# Patient Record
Sex: FEMALE | Race: BLACK OR AFRICAN AMERICAN | Employment: OTHER | URBAN - METROPOLITAN AREA
[De-identification: names, ages, dates, MRNs, and addresses within clinical notes are randomized per-mention and may not be internally consistent; named-entity substitution may affect disease eponyms.]

---

## 2017-02-27 ENCOUNTER — OFFICE VISIT (OUTPATIENT)
Dept: GYNECOLOGY | Age: 72
End: 2017-02-27

## 2017-02-27 ENCOUNTER — HOSPITAL ENCOUNTER (OUTPATIENT)
Dept: LAB | Age: 72
Discharge: HOME | End: 2017-02-27
Payer: MEDICARE

## 2017-02-27 VITALS
HEIGHT: 66 IN | BODY MASS INDEX: 47.09 KG/M2 | HEART RATE: 80 BPM | WEIGHT: 293 LBS | SYSTOLIC BLOOD PRESSURE: 163 MMHG | DIASTOLIC BLOOD PRESSURE: 85 MMHG

## 2017-02-27 DIAGNOSIS — C54.1 ENDOMETRIAL CANCER (HCC): Primary | ICD-10-CM

## 2017-02-27 DIAGNOSIS — E66.01 OBESITY, MORBID, BMI 50 OR HIGHER (HCC): ICD-10-CM

## 2017-02-27 PROCEDURE — 88142 CYTOPATH C/V THIN LAYER: CPT | Performed by: OBSTETRICS & GYNECOLOGY

## 2017-02-27 NOTE — PROGRESS NOTES
[address], [address], [address]  [id number] [phone]  Dr. Heber Garber, III    Tonya Gold, [facility]     Office Note  Patient ID:  Name: Magali Arguelles  MRM: [id number]  : 1945/71 y.o. Date: 2017    Diagnosis:     ICD-10-CM ICD-9-CM    1. Endometrial cancer (HCC) C54.1 182.0 PAP, LIQUID BASED, MANUAL SCREEN   2. Obesity, morbid, BMI 50 or higher (HCC) E66.01 278.01 PAP, LIQUID BASED, MANUAL SCREEN       Problem List:   Patient Active Problem List   Diagnosis Code    Atrial fibrillation ([facility] I48.91    Chronic anticoagulation Z79.01    Ob[facility], morbid, BMI 50 or higher [facility] E66.01       SUBJECTIVE:    Magali Arguelles is a 70 y.o. female who presents for followup postoperative care following staging resection, 10/19/2015,     The patient's pathology revealed   FINAL PATHOLOGIC DIAGNOSIS  1.  Uterus, bilateral fallopian tubes and ovaries, hysterectomy bilateral salpingo-oophorectomy:  Adenocarcinoma, endometrioid type of the uterine corpus, FIGO grade 1 of 3, invading 0.7cm of 1.5cm  multifocal (three foci)  Benign cervix, no tumor seen  Leiomyoma uteri  Endometriosis  Benign parametrial remnants, no tumor seen  Bilateral fallopian tubes, no tumor seen  Bilateral ovaries, no tumor seen  Synoptic Report:  Specimen: Uterus, bilateral fallopian tubes and ovaries  Procedure: Hysterectomy, bilateral salpingo-oophorectomy  Lymph node sampling: None  Specimen integrity: Intact hysterectomy specimen  Tumor size: 7.0 x 6.0 cm (left lateral); 2.0 (anterior fundus); 1.3cm (posterior)  Histologic type: Endometrioid adenocarcinoma  Histologic ndgndrndanddndend:nd nd2nd Myometrial invasion:  Depth of invasion: 0.7cm at the deepest location  Myometrial thickness: 1.5cm  Involvement of cervix: Not involved  Extent of involvement of other organs:  Right ovary: Not involved  Left ovary: Not involved  Right fallopian tube: Not involved  Left fallopian tube: Not involved  Lymphovascular invasion: Present  Additional pathologic findings: Leiomyoma, endometriosis  Pathologic staging (pTNM):  Primary tumor (pT): pT1a    Regional lymph nodes (pN): pNx  Pelvic lymph nodes:  Number examined: 0  Number involved: N/A  Para-aortic lymph nodes:  Number examined: 0  Number involved: N/A  Distant metastasis (M): Not applicable  2. Umbilicus, excision:  Benign skin and subcutaneous fibroadipose tissue, no histopathologic changes  3. Hernia, herniorrhaphy:  Benign mesothelial lined fibroadipose tissue with mild chronic inflammation consistent with hernia sac    Cytology: 8/25/2016, 2/2016   Negative    Mammogram to be scheduled. Currently she has no problems with eating, bowel movements, voiding. Occational stool softener  Appetite is good. Eating a regular diet without difficulty. Bowel movements are regular. Negative cardiopulmonary review. The patient is not having any pain. .    Medications:     Current Outpatient Prescriptions on File Prior to Visit   Medication Sig Dispense Refill    ferrous sulfate (IRON) 325 mg (65 mg iron) tablet Take 65 mg by mouth three (3) times daily (with meals).  MULTIVITAMIN (MULTIPLE VITAMINS PO) Take 1 Tab by mouth daily.  ELIQUIS 5 mg tablet       atorvastatin (LIPITOR) 20 mg tablet 20 mg nightly.  diltiazem CD (CARDIZEM CD) 240 mg ER capsule 240 mg daily.  levothyroxine (SYNTHROID) 75 mcg tablet       lisinopril (PRINIVIL, ZESTRIL) 20 mg tablet 20 mg daily.  torsemide (DEMADEX) 20 mg tablet daily.  oxyCODONE-acetaminophen (PERCOCET) 5-325 mg per tablet Take 1 Tab by mouth every four (4) hours as needed for Pain. Max Daily Amount: 6 Tabs. 25 Tab 0     No current facility-administered medications on file prior to visit.       Allergies:   No Known Allergies  Past Medical History:   Diagnosis Date    Arrhythmia     a fib    Arthritis     High cholesterol     History of GI diverticular bleed  Hypertension     Iron deficiency anemia     Morbid obesity ([facility]     Thyroid disease      Past Surgical History:   Procedure Laterality Date    HX COLONOSCOPY      HX ENDOSCOPY      HX GYN      2 vaginal births     Social History     Social History    Marital status:      Spouse name: N/A    Number of children: N/A    Years of education: N/A     Occupational History    Not on file. Social History Main Topics    Smoking status: Never Smoker    Smokeless tobacco: Never Used    Alcohol use No    Drug use: No    Sexual activity: Not Currently     Other Topics Concern    Not on file     Social History Narrative       OBJECTIVE:    Vitals:   Vitals:    02/27/17 1014   BP: 163/85   Pulse: 80   Weight: 338 lb 12.8 oz (153.7 kg)   Height: 5' 6\" (1.676 m)     Physical Examination:     General:  Nodes: HEENT:  no distress, oriented, alert  Negative throughout  Normocephalic, no JVD, no thyroid nodularity   Lungs: lungs clear to auscultationand percussion   Cardiac: Regular rate and rhythm   Abdomen: soft, bowel sounds active, non-tender  No CVA tenderness   Incision: healing well   Pelvic: Vulva: No gross lesion, BUS negative  Vagina: No suspicious lesion. No suspicious induration or nodularity. Cytology taken  Bimanual: No suspicious mass effect, no tenderness, Limited examination due to obesity   Rectal: not done   Extremity:   extremities normal, atraumatic, no cyanosis or edema     IMPRESSION:    Magali Arguelles is Doing well postoperatively. .  She has a working diagnosis of     ICD-10-CM ICD-9-CM    1. Endometrial cancer (HCC) C54.1 182.0 PAP, LIQUID BASED, MANUAL SCREEN   2. Obesity, morbid, BMI 50 or higher (HCC) E66.01 278.01 PAP, LIQUID BASED, MANUAL SCREEN    .     Medical problems:     Patient Active Problem List   Diagnosis Code    Atrial fibrillation (HCC) I48.91    Chronic anticoagulation Z79.01    Obesity, morbid, BMI 50 or higher ([facility] E66.01       PLAN:    The operative procedures and clinical results have been reviewed with the patient. Implications of diagnosis discussed at length. All questions answered. Observation only. Recurrence risks noted  Cytology taken  I will see the patient back in 6 month(s). The patient is advised to call our office with any problems or concerns. Goran Tran MD  2/27/2017/1:44 PM    Defined Sensitive Document    CC:   Bharath Tenorio MD   No ref.  provider found

## 2017-03-10 NOTE — PROGRESS NOTES
Patient:   Mitzi Moser  SSN: [id number]  : 1945    Date:    3/10/2017    Ms. Mitzi Moser cytology/Pap smear has been interpreted as within normal limts. I would ask that subsequent Pap smears be performed at the interval discussed at the last office visit.     If there are any questions please do not hesitate to contact our offices ([phone])    Terry Soni MD

## 2017-08-29 ENCOUNTER — OFFICE VISIT (OUTPATIENT)
Dept: GYNECOLOGY | Age: 72
End: 2017-08-29

## 2017-08-29 ENCOUNTER — HOSPITAL ENCOUNTER (OUTPATIENT)
Dept: LAB | Age: 72
Discharge: HOME | End: 2017-08-29
Payer: MEDICARE

## 2017-08-29 VITALS
BODY MASS INDEX: 47.09 KG/M2 | WEIGHT: 293 LBS | DIASTOLIC BLOOD PRESSURE: 74 MMHG | HEIGHT: 66 IN | HEART RATE: 75 BPM | SYSTOLIC BLOOD PRESSURE: 141 MMHG

## 2017-08-29 DIAGNOSIS — Z85.42 HISTORY OF ENDOMETRIAL CANCER: Primary | ICD-10-CM

## 2017-08-29 DIAGNOSIS — E66.01 OBESITY, MORBID, BMI 40.0-49.9 (HCC): ICD-10-CM

## 2017-08-29 PROCEDURE — 88142 CYTOPATH C/V THIN LAYER: CPT | Performed by: OBSTETRICS & GYNECOLOGY

## 2017-08-29 NOTE — PROGRESS NOTES
[address] [facility] [address], [address]  [phone]  Dr. Amado Nicole, [facility]     Office Note  Patient ID:  Name: Mario Leon  MRM: [id number]  : 1945/71 y.o. Date: 2017    Diagnosis:     ICD-10-CM ICD-9-CM    1. History of endometrial cancer Z85.42 V10.42    2. Obesity, morbid, BMI 40.0-49.9 ([facility]) E66.01 278.01        Problem List:   Patient Active Problem List   Diagnosis Code    Atrial fibrillation ([facility] I48.91    Chronic anticoagulation Z79.01    Obesity, morbid, BMI 50 or higher ([facility] E66.01       SUBJECTIVE:    Mario Leon is a 70 y.o. female who presents for followup postoperative care following staging resection, 10/19/2015,     The patient's pathology revealed   FINAL PATHOLOGIC DIAGNOSIS  1.  Uterus, bilateral fallopian tubes and ovaries, hysterectomy bilateral salpingo-oophorectomy:  Adenocarcinoma, endometrioid type of the uterine corpus, FIGO grade 1 of 3, invading 0.7cm of 1.5cm  multifocal (three foci)  Benign cervix, no tumor seen  Leiomyoma uteri  Endometriosis  Benign parametrial remnants, no tumor seen  Bilateral fallopian tubes, no tumor seen  Bilateral ovaries, no tumor seen  Synoptic Report:  Specimen: Uterus, bilateral fallopian tubes and ovaries  Procedure: Hysterectomy, bilateral salpingo-oophorectomy  Lymph node sampling: None  Specimen integrity: Intact hysterectomy specimen  Tumor size: 7.0 x 6.0 cm (left lateral); 2.0 (anterior fundus); 1.3cm (posterior)  Histologic type: Endometrioid adenocarcinoma  Histologic ndgndrndanddndend:nd nd2nd Myometrial invasion:  Depth of invasion: 0.7cm at the deepest location  Myometrial thickness: 1.5cm  Involvement of cervix: Not involved  Extent of involvement of other organs:  Right ovary: Not involved  Left ovary: Not involved  Right fallopian tube: Not involved  Left fallopian tube: Not involved  Lymphovascular invasion: Present  Additional pathologic findings: Leiomyoma, endometriosis  Pathologic staging (pTNM):  Primary tumor (pT): pT1a    Regional lymph nodes (pN): pNx  Pelvic lymph nodes:  Number examined: 0  Number involved: N/A  Para-aortic lymph nodes:  Number examined: 0  Number involved: N/A  Distant metastasis (M): Not applicable  2. Umbilicus, excision:  Benign skin and subcutaneous fibroadipose tissue, no histopathologic changes  3. Hernia, herniorrhaphy:  Benign mesothelial lined fibroadipose tissue with mild chronic inflammation consistent with hernia sac    Cytology: 2/27/2017, 8/25/2016, 2/2016   Negative    Mammogram to be scheduled. Currently she has no problems with eating, bowel movements, voiding. Occational stool softener  Appetite is good. Eating a regular diet without difficulty. Bowel movements are regular. Negative cardiopulmonary review. The patient is not having any pain. .    Medications:     Current Outpatient Prescriptions on File Prior to Visit   Medication Sig Dispense Refill    GLUCOSAMINE HCL/CHONDR VALERIO A NA (OSTEO BI-FLEX PO) Take  by mouth.  ferrous sulfate (IRON) 325 mg (65 mg iron) tablet Take 65 mg by mouth three (3) times daily (with meals).  MULTIVITAMIN (MULTIPLE VITAMINS PO) Take 1 Tab by mouth daily.  ELIQUIS 5 mg tablet       atorvastatin (LIPITOR) 20 mg tablet 20 mg nightly.  diltiazem CD (CARDIZEM CD) 240 mg ER capsule 240 mg daily.  levothyroxine (SYNTHROID) 75 mcg tablet       lisinopril (PRINIVIL, ZESTRIL) 20 mg tablet 20 mg daily.  torsemide (DEMADEX) 20 mg tablet daily.  oxyCODONE-acetaminophen (PERCOCET) 5-325 mg per tablet Take 1 Tab by mouth every four (4) hours as needed for Pain. Max Daily Amount: 6 Tabs. 25 Tab 0     No current facility-administered medications on file prior to visit.       Allergies:   No Known Allergies  Past Medical History:   Diagnosis Date    Arrhythmia     a fib    Arthritis     High cholesterol     History of GI diverticular bleed     Hypertension     Iron deficiency anemia     Morbid obesity ([facility]     Thyroid disease      Past Surgical History:   Procedure Laterality Date    HX COLONOSCOPY      HX ENDOSCOPY      HX GYN      2 vaginal births     Social History     Social History    Marital status:      Spouse name: N/A    Number of children: N/A    Years of education: N/A     Occupational History    Not on file. Social History Main Topics    Smoking status: Never Smoker    Smokeless tobacco: Never Used    Alcohol use No    Drug use: No    Sexual activity: Not Currently     Other Topics Concern    Not on file     Social History Narrative       OBJECTIVE:    Vitals:   Vitals:    08/29/17 1012   BP: 141/74   Pulse: 75   Weight: 153 kg (337 lb 6.4 oz)   Height: 5' 6\" (1.676 m)     Physical Examination:     General:  Nodes: HEENT:  no distress, oriented, alert  Negative throughout  Normocephalic, no JVD, no thyroid nodularity   Lungs: lungs clear to auscultationand percussion   Cardiac: Regular rate and rhythm   Abdomen: soft, bowel sounds active, non-tender  No CVA tenderness   Incision: healing well   Pelvic: Vulva: No gross lesion, BUS negative  Vagina: No suspicious lesion. No suspicious induration or nodularity. Cytology taken  Bimanual: No suspicious mass effect, no tenderness, Limited examination due to obesity   Rectal: not done   Extremity:   extremities normal, atraumatic, no cyanosis or edema     IMPRESSION:    [place] is Doing well postoperatively. .  She has a working diagnosis of     ICD-10-CM ICD-9-CM    1. History of endometrial cancer Z85.42 V10.42    2. Obesity, morbid, BMI 40.0-49.9 ([facility]) E66.01 278.01     .     Medical problems:     Patient Active Problem List   Diagnosis Code    Atrial fibrillation ([facility]) I48.91    Chronic anticoagulation Z79.01    Obesity, morbid, BMI 50 or higher ([facility] E66.01       PLAN:    The operative procedures and clinical results have been reviewed with the patient. Implications of diagnosis discussed at length. All questions answered. Observation only. Recurrence risks noted  Cytology taken  I will see the patient back in 6 month(s). The patient is advised to call our office with any problems or concerns. Liban Lobo MD  8/29/2017/1:44 PM    Defined Sensitive Document    CC:   Leonidas Badillo MD   No ref.  provider found

## 2017-08-29 NOTE — PROGRESS NOTES
6 month check up, pt complains of left sided soreness in there pelvic region that is off and on and she has noticed it for the past few months, Patient states she is no longer taking the following medications: Percocet

## 2017-08-29 NOTE — PATIENT INSTRUCTIONS
[employer] Activation    Thank you for requesting access to [employer]. Please follow the instructions below to securely access and download your online medical record. [employer] allows you to send messages to your doctor, view your test results, renew your prescriptions, schedule appointments, and more. How Do I Sign Up? 1. In your internet browser, go to [email or web address]. 2. Click on the First Time User? Click Here link in the Sign In box. You will see the New Member Sign Up page. 3. Enter your [employer] Access Code exactly as it appears below. You will not need to use this code after youve completed the sign-up process. If you do not sign up before the expiration date, you must request a new code. [employer] Access Code: [email or web address]  Expires: 2017 10:08 AM (This is the date your [employer] access code will )    4. Enter the last four digits of your Social Security Number (xxxx) and Date of Birth (mm/dd/yyyy) as indicated and click Submit. You will be taken to the next sign-up page. 5. Create a [employer] ID. This will be your [employer] login ID and cannot be changed, so think of one that is secure and easy to remember. 6. Create a [employer] password. You can change your password at any time. 7. Enter your Password Reset Question and Answer. This can be used at a later time if you forget your password. 8. Enter your e-mail address. You will receive e-mail notification when new information is available in [address]. 9. Click Sign Up. You can now view and download portions of your medical record. 10. Click the Download Summary menu link to download a portable copy of your medical information. Additional Information    If you have questions, please visit the Frequently Asked Questions section of the [employer] website at [email or web address] Remember, [employer] is NOT to be used for urgent needs. For medical emergencies, dial 911.

## 2017-09-19 NOTE — PROGRESS NOTES
Patient:   Zena Grewal  SSN: [id number]  : 1945    Date:    2017    Ms. Zena Grewal cytology/Pap smear has been interpreted as within normal limts. I would ask that subsequent Pap smears be performed at the interval discussed at the last office visit.     If there are any questions please do not hesitate to contact our offices ([phone])    Terry Soni MD

## 2017-09-19 NOTE — PROGRESS NOTES
Patient:   Lew Huff  SSN: [id number]  : 1945    Date:    2017    Ms. Lew Huff cytology/Pap smear has been interpreted as within normal limts. I would ask that subsequent Pap smears be performed at the interval discussed at the last office visit.     If there are any questions please do not hesitate to contact our offices ([phone])    Terry Soni MD

## 2018-02-20 ENCOUNTER — HOSPITAL ENCOUNTER (OUTPATIENT)
Dept: LAB | Age: 73
Discharge: HOME | End: 2018-02-20
Payer: MEDICARE

## 2018-02-20 ENCOUNTER — OFFICE VISIT (OUTPATIENT)
Dept: GYNECOLOGY | Age: 73
End: 2018-02-20

## 2018-02-20 VITALS
DIASTOLIC BLOOD PRESSURE: 79 MMHG | BODY MASS INDEX: 47.09 KG/M2 | SYSTOLIC BLOOD PRESSURE: 122 MMHG | WEIGHT: 293 LBS | HEART RATE: 71 BPM | HEIGHT: 66 IN

## 2018-02-20 DIAGNOSIS — Z85.42 HISTORY OF ENDOMETRIAL CANCER: Primary | ICD-10-CM

## 2018-02-20 DIAGNOSIS — E66.01 OBESITY, MORBID, BMI 40.0-49.9 (HCC): ICD-10-CM

## 2018-02-20 PROCEDURE — 88142 CYTOPATH C/V THIN LAYER: CPT | Performed by: OBSTETRICS & GYNECOLOGY

## 2018-02-20 RX ORDER — ALLOPURINOL 100 MG/1
TABLET ORAL
COMMUNITY
Start: 2017-12-18

## 2018-02-20 NOTE — PROGRESS NOTES
6 month check up, pt reports no abnormal spotting or bleeding, pt states she has no questions or concerns for today's visit, Patient states she is no longer taking the following medications: percocet, Initial blood pressure reading 144/98, repeat blood pressure reading 122/79, the note under my ACP was placed in error, it was deleted but still shows

## 2018-02-20 NOTE — PROGRESS NOTES
[address], [address]  [phone]  Dr. Mariam Love, III    Dr. Adrianne Garza, Raine Gamaliel, [facility]     Office Note  Patient ID:  Name: Ankita Garcia  MRM: [id number]  : 1945/72 y.o. Date: 2018    Diagnosis:     ICD-10-CM ICD-9-CM    1. History of endometrial cancer Z85.42 V10.42 PAP, LIQUID BASED, MANUAL SCREEN   2. Obesity, morbid, BMI 40.0-49.9 ([facility]) E66.01 278.01        Problem List:   Patient Active Problem List   Diagnosis Code    Atrial fibrillation ([facility] I48.91    Chronic anticoagulation Z79.01    Ob[facility], morbid, BMI 50 or higher [facility] E66.01       SUBJECTIVE:    Ankita Garcia is a 67 y.o. female who presents for followup care following staging resection, 10/19/2015,     The patient's pathology revealed   FINAL PATHOLOGIC DIAGNOSIS  1.  Uterus, bilateral fallopian tubes and ovaries, hysterectomy bilateral salpingo-oophorectomy:  Adenocarcinoma, endometrioid type of the uterine corpus, FIGO grade 1 of 3, invading 0.7cm of 1.5cm  multifocal (three foci)  Benign cervix, no tumor seen  Leiomyoma uteri  Endometriosis  Benign parametrial remnants, no tumor seen  Bilateral fallopian tubes, no tumor seen  Bilateral ovaries, no tumor seen  Synoptic Report:  Specimen: Uterus, bilateral fallopian tubes and ovaries  Procedure: Hysterectomy, bilateral salpingo-oophorectomy  Lymph node sampling: None  Specimen integrity: Intact hysterectomy specimen  Tumor size: 7.0 x 6.0 cm (left lateral); 2.0 (anterior fundus); 1.3cm (posterior)  Histologic type: Endometrioid adenocarcinoma  Histologic ndgndrndanddndend:nd nd2nd Myometrial invasion:  Depth of invasion: 0.7cm at the deepest location  Myometrial thickness: 1.5cm  Involvement of cervix: Not involved  Extent of involvement of other organs:  Right ovary: Not involved  Left ovary: Not involved  Right fallopian tube: Not involved  Left fallopian tube: Not involved  Lymphovascular invasion: Present  Additional pathologic findings: Leiomyoma, endometriosis  Pathologic staging (pTNM):  Primary tumor (pT): pT1a    Regional lymph nodes (pN): pNx  Pelvic lymph nodes:  Number examined: 0  Number involved: N/A  Para-aortic lymph nodes:  Number examined: 0  Number involved: N/A  Distant metastasis (M): Not applicable  2. Umbilicus, excision:  Benign skin and subcutaneous fibroadipose tissue, no histopathologic changes  3. Hernia, herniorrhaphy:  Benign mesothelial lined fibroadipose tissue with mild chronic inflammation consistent with hernia sac    Cytology: 8/2017, 2/27/2017, 8/25/2016, 2/2016   Negative    Mammogram to be scheduled. 2/20/2018: The patient presents at a six month interval.  Active, no restrictions. Ongoing treatment for hyperuricacidemia (Allopurinal)  Currently she has no problems with eating, bowel movements, voiding. Occational stool softener  Appetite is good. Eating a regular diet without difficulty. Bowel movements are regular. Negative cardiopulmonary review. The patient is not having any pain. .    Medications:     Current Outpatient Prescriptions on File Prior to Visit   Medication Sig Dispense Refill    GLUCOSAMINE HCL/CHONDR VALERIO A NA (OSTEO BI-FLEX PO) Take  by mouth.  ferrous sulfate (IRON) 325 mg (65 mg iron) tablet Take 65 mg by mouth three (3) times daily (with meals).  MULTIVITAMIN (MULTIPLE VITAMINS PO) Take 1 Tab by mouth daily.  ELIQUIS 5 mg tablet       atorvastatin (LIPITOR) 20 mg tablet 20 mg nightly.  diltiazem CD (CARDIZEM CD) 240 mg ER capsule 240 mg daily.  levothyroxine (SYNTHROID) 75 mcg tablet       lisinopril (PRINIVIL, ZESTRIL) 20 mg tablet 20 mg daily.  torsemide (DEMADEX) 20 mg tablet daily.  oxyCODONE-acetaminophen (PERCOCET) 5-325 mg per tablet Take 1 Tab by mouth every four (4) hours as needed for Pain. Max Daily Amount: 6 Tabs.  25 Tab 0     No current facility-administered medications on file prior to visit. Allergies:   No Known Allergies  Past Medical History:   Diagnosis Date    Arrhythmia     a fib    Arthritis     Gout 06/2017    High cholesterol     History of GI diverticular bleed     Hypertension     Iron deficiency anemia     Morbid obesity ([address]     Thyroid disease      Past Surgical History:   Procedure Laterality Date    HX COLONOSCOPY      HX ENDOSCOPY      HX GYN      2 vaginal births     Social History     Social History    Marital status:      Spouse name: N/A    Number of children: N/A    Years of education: N/A     Occupational History    Not on file. Social History Main Topics    Smoking status: Never Smoker    Smokeless tobacco: Never Used    Alcohol use No    Drug use: No    Sexual activity: Not Currently     Other Topics Concern    Not on file     Social History Narrative       OBJECTIVE:    Vitals:   Vitals:    02/20/18 1041 02/20/18 1047   BP: (!) 144/98 122/79   Pulse: 68 71   Weight: 335 lb (152 kg)    Height: 5' 6\" (1.676 m)      Physical Examination:     General:  Nodes: HEENT:  no distress, oriented, alert  Negative throughout  Normocephalic, no JVD, no thyroid nodularity   Lungs: lungs clear to auscultationand percussion   Cardiac: Regular rate and rhythm   Abdomen: soft, bowel sounds active, non-tender  No CVA tenderness   Incision: healing well   Pelvic: Vulva: No gross lesion, BUS negative  Vagina: No suspicious lesion. No suspicious induration or nodularity. Cytology taken  Bimanual: No suspicious mass effect, no tenderness, Limited examination due to obesity   Rectal: not done   Extremity:   extremities normal, atraumatic, no cyanosis or edema     IMPRESSION:    Joe Zapata  has a working diagnosis of     ICD-10-CM ICD-9-CM    1. History of endometrial cancer Z85.42 V10.42 PAP, LIQUID BASED, MANUAL SCREEN   2. Obesity, morbid, BMI 40.0-49.9 ([facility]) E66.01 278.01     .     Medical problems:     Patient Active Problem List Diagnosis Code    Atrial fibrillation (HCC) I48.91    Chronic anticoagulation Z79.01    Obesity, morbid, BMI 50 or higher (HCC) E66.01       PLAN:    The operative procedures and clinical results have been reviewed with the patient. Implications of diagnosis discussed at length. All questions answered. Observation only. Recurrence risks noted  Cytology taken  I will see the patient back in 6 month(s). The patient is advised to call our office with any problems or concerns. Maria Del Rosario Noguera MD  2/20/2018/1:44 PM    Defined Sensitive Document    CC:   Lala Muniz MD   No ref.  provider found

## 2018-03-19 NOTE — PROGRESS NOTES
Patient:   Jourdan Maya  SSN: [id number]  : 1945    Date:    3/19/2018    Ms. Jourdan Maya cytology/Pap smear has been interpreted as within normal limts. I would ask that subsequent Pap smears be performed at the interval discussed at the last office visit.     If there are any questions please do not hesitate to contact our offices ([phone])    Steven Epps MD

## 2018-08-21 ENCOUNTER — OFFICE VISIT (OUTPATIENT)
Dept: GYNECOLOGY | Age: 73
End: 2018-08-21

## 2018-08-21 ENCOUNTER — HOSPITAL ENCOUNTER (OUTPATIENT)
Dept: LAB | Age: 73
Discharge: HOME | End: 2018-08-21
Payer: MEDICARE

## 2018-08-21 VITALS
SYSTOLIC BLOOD PRESSURE: 148 MMHG | DIASTOLIC BLOOD PRESSURE: 98 MMHG | HEART RATE: 85 BPM | HEIGHT: 66 IN | WEIGHT: 293 LBS | BODY MASS INDEX: 47.09 KG/M2

## 2018-08-21 DIAGNOSIS — Z85.42 HISTORY OF ENDOMETRIAL CANCER: Primary | ICD-10-CM

## 2018-08-21 DIAGNOSIS — E66.01 OBESITY, MORBID, BMI 50 OR HIGHER (HCC): ICD-10-CM

## 2018-08-21 PROCEDURE — 88142 CYTOPATH C/V THIN LAYER: CPT | Performed by: OBSTETRICS & GYNECOLOGY

## 2018-08-21 NOTE — PATIENT INSTRUCTIONS
Ynsect Activation    Thank you for requesting access to Ynsect. Please follow the instructions below to securely access and download your online medical record. Ynsect allows you to send messages to your doctor, view your test results, renew your prescriptions, schedule appointments, and more. How Do I Sign Up? 1. In your internet browser, go to [email or web address]. 2. Click on the First Time User? Click Here link in the Sign In box. You will see the New Member Sign Up page. 3. Enter your Ynsect Access Code exactly as it appears below. You will not need to use this code after youve completed the sign-up process. If you do not sign up before the expiration date, you must request a new code. Ynsect Access Code: [email or web address]  Expires: 2018  8:18 AM (This is the date your Ynsect access code will )    4. Enter the last four digits of your Social Security Number (xxxx) and Date of Birth (mm/dd/yyyy) as indicated and click Submit. You will be taken to the next sign-up page. 5. Create a Ynsect ID. This will be your Ynsect login ID and cannot be changed, so think of one that is secure and easy to remember. 6. Create a Ynsect password. You can change your password at any time. 7. Enter your Password Reset Question and Answer. This can be used at a later time if you forget your password. 8. Enter your e-mail address. You will receive e-mail notification when new information is available in [address] 9. Click Sign Up. You can now view and download portions of your medical record. 10. Click the Download Summary menu link to download a portable copy of your medical information. Additional Information    If you have questions, please visit the Frequently Asked Questions section of the Ynsect website at [email or web address] Remember, Ynsect is NOT to be used for urgent needs. For medical emergencies, dial 911.

## 2018-08-21 NOTE — PROGRESS NOTES
[address] [facility] [address], [address]  [phone]  Dr. Riley Ramirez, III    Facundo Jay, [facility]     Office Note  Patient ID:  Name: Adiel Young  MRM: [id number]  : 1945/72 y.o. Date: 2018    Diagnosis:     ICD-10-CM ICD-9-CM    1. History of endometrial cancer Z85.42 V10.42 PAP, LIQUID BASED, MANUAL SCREEN   2. Obesity, morbid, BMI 50 or higher ([facility]) E66.01 278.01        Problem List:   Patient Active Problem List   Diagnosis Code    Atrial fibrillation ([facility] I48.91    Chronic anticoagulation Z79.01    Obesity, morbid, BMI 50 or higher ([facility] E66.01       SUBJECTIVE:    Adiel Young is a 67 y.o. female who presents for followup care following staging resection, 10/19/2015,     The patient's pathology revealed   FINAL PATHOLOGIC DIAGNOSIS  1.  Uterus, bilateral fallopian tubes and ovaries, hysterectomy bilateral salpingo-oophorectomy:  Adenocarcinoma, endometrioid type of the uterine corpus, FIGO grade 1 of 3, invading 0.7cm of 1.5cm  multifocal (three foci)  Benign cervix, no tumor seen  Leiomyoma uteri  Endometriosis  Benign parametrial remnants, no tumor seen  Bilateral fallopian tubes, no tumor seen  Bilateral ovaries, no tumor seen  Synoptic Report:  Specimen: Uterus, bilateral fallopian tubes and ovaries  Procedure: Hysterectomy, bilateral salpingo-oophorectomy  Lymph node sampling: None  Specimen integrity: Intact hysterectomy specimen  Tumor size: 7.0 x 6.0 cm (left lateral); 2.0 (anterior fundus); 1.3cm (posterior)  Histologic type: Endometrioid adenocarcinoma  Histologic ndgndrndanddndend:nd nd2nd Myometrial invasion:  Depth of invasion: 0.7cm at the deepest location  Myometrial thickness: 1.5cm  Involvement of cervix: Not involved  Extent of involvement of other organs:  Right ovary: Not involved  Left ovary: Not involved  Right fallopian tube: Not involved  Left fallopian tube: Not involved  Lymphovascular invasion: Present  Additional pathologic findings: Leiomyoma, endometriosis  Pathologic staging (pTNM):  Primary tumor (pT): pT1a    Regional lymph nodes (pN): pNx  Pelvic lymph nodes:  Number examined: 0  Number involved: N/A  Para-aortic lymph nodes:  Number examined: 0  Number involved: N/A  Distant metastasis (M): Not applicable  2. Umbilicus, excision:  Benign skin and subcutaneous fibroadipose tissue, no histopathologic changes  3. Hernia, herniorrhaphy:  Benign mesothelial lined fibroadipose tissue with mild chronic inflammation consistent with hernia sac    Cytology: 2/2018, 8/2017, 2/27/2017, 8/25/2016, 2/2016   Negative    Mammogram to be scheduled. 8/21/2018: The patient presents at a six month interval.  Active, no restrictions. Ongoing treatment for hyperuricacidemia (Allopurinal)  Currently she has no problems with eating, bowel movements, voiding. Occational stool softener  Appetite is good. Eating a regular diet without difficulty. Bowel movements are regular. Negative cardiopulmonary review. The patient is not having any pain. .    Medications:     Current Outpatient Prescriptions on File Prior to Visit   Medication Sig Dispense Refill    allopurinol (ZYLOPRIM) 100 mg tablet       GLUCOSAMINE HCL/CHONDR VALERIO A NA (OSTEO BI-FLEX PO) Take  by mouth.  ferrous sulfate (IRON) 325 mg (65 mg iron) tablet Take 65 mg by mouth three (3) times daily (with meals).  MULTIVITAMIN (MULTIPLE VITAMINS PO) Take 1 Tab by mouth daily.  ELIQUIS 5 mg tablet       atorvastatin (LIPITOR) 20 mg tablet 20 mg nightly.  diltiazem CD (CARDIZEM CD) 240 mg ER capsule 240 mg daily.  levothyroxine (SYNTHROID) 75 mcg tablet       lisinopril (PRINIVIL, ZESTRIL) 20 mg tablet 20 mg daily.  torsemide (DEMADEX) 20 mg tablet daily.  oxyCODONE-acetaminophen (PERCOCET) 5-325 mg per tablet Take 1 Tab by mouth every four (4) hours as needed for Pain. Max Daily Amount: 6 Tabs.  25 Tab 0     No current facility-administered medications on file prior to visit. Allergies:   No Known Allergies  Past Medical History:   Diagnosis Date    Arrhythmia     a fib    Arthritis     Gout 06/2017    High cholesterol     History of GI diverticular bleed     Hypertension     Iron deficiency anemia     Morbid obesity ([address]     Thyroid disease      Past Surgical History:   Procedure Laterality Date    HX COLONOSCOPY      HX ENDOSCOPY      HX GYN      2 vaginal births     Social History     Social History    Marital status:      Spouse name: N/A    Number of children: N/A    Years of education: N/A     Occupational History    Not on file. Social History Main Topics    Smoking status: Never Smoker    Smokeless tobacco: Never Used    Alcohol use No    Drug use: No    Sexual activity: Not Currently     Other Topics Concern    Not on file     Social History Narrative       OBJECTIVE:    Vitals:   Vitals:    08/21/18 0835   BP: (!) 148/98   Pulse: 85   Weight: 334 lb (151.5 kg)   Height: 5' 6\" (1.676 m)     Physical Examination:     General:  Nodes: HEENT:  no distress, oriented, alert  Negative throughout  Normocephalic, no JVD, no thyroid nodularity   Lungs: lungs clear to auscultationand percussion   Cardiac: Irregular rate consistent with AF. Peripheral pulse rate 80's   Abdomen: Rotund, soft, bowel sounds active, non-tender, no gross organomegaly or fluid wave, limited examination due to habitus. No CVA tenderness   Incision: healing well   Pelvic: Vulva: No gross lesion, BUS negative  Vagina: No suspicious lesion. No suspicious induration or nodularity. Cytology taken  Bimanual: No suspicious mass effect, no tenderness, Limited examination due to obesity   Rectal: not done   Extremity:   extremities normal, atraumatic, no cyanosis or edema     IMPRESSION:    Yenni Carrion  has a working diagnosis of     ICD-10-CM ICD-9-CM    1.  History of endometrial cancer Z85.42 V10.42 PAP, LIQUID BASED, MANUAL SCREEN   2. Obesity, morbid, BMI 50 or higher (HCC) E66.01 278.01     . Medical problems:     Patient Active Problem List   Diagnosis Code    Atrial fibrillation (HCC) I48.91    Chronic anticoagulation Z79.01    Obesity, morbid, BMI 50 or higher ([facility] [address] E66.01       PLAN:    Recurrence risks noted  Cytology taken  I will see the patient back in 6 month(s). The patient is advised to call our office with any problems or concerns. Continue Cardiology and IM consultation. Weight loss encouraged. Landon Ortega MD  8/21/2018/1:44 PM    Defined Sensitive Document    CC:   Breanna Patel MD   No ref.  provider found

## 2018-08-26 NOTE — PROGRESS NOTES
Patient:   Sindy Golden  SSN: [id number]  : 1945    Date:    2018    Ms. Sindy Golden cytology/Pap smear has been interpreted as within normal limts. I would ask that subsequent Pap smears be performed at the interval discussed at the last office visit.     If there are any questions please do not hesitate to contact our offices [phone] Sam Epps MD

## 2019-02-11 NOTE — PROGRESS NOTES
[address] [facility], [address] [place] [address] 
26 [id number] [phone] Office Note Patient ID: 
Name: Angel Merida MRM: [id number] : 73 y.o. Date: 2019 Problem List:  
Patient Active Problem List  
Diagnosis Code  Atrial fibrillation (HCC) I48.91  
 Chronic anticoagulation Z79.01  
 Obesity, morbid, BMI 50 or higher (HCC) E66.01  
 Endometrial cancer (HCC) C54.1 SUBJECTIVE: 
Ms. Angel Merida is a 68 y.o. female s/p TLH/BSO/staging on 10/19/2015 for Stage Ia, FIGO Grade 1 endometrial cancer. Presents back today for continued surveillance. Reports an episode of spotting in December, but denies any bleeding since that time. Denies pelvic/abdominal pain, nausea, vomiting, change in appetite or bowel habits, bloating, hematuria, or hematochezia. Reports some constipation, but this is normal for her. Pathology Review 10/19/2015: 
FINAL PATHOLOGIC DIAGNOSIS 1. Uterus, bilateral fallopian tubes and ovaries, hysterectomy bilateral salpingo-oophorectomy: 
Adenocarcinoma, endometrioid type of the uterine corpus, FIGO grade 1 of 3, invading 0.7cm of 1.5cm 
multifocal (three foci) Benign cervix, no tumor seen Leiomyoma uteri Endometriosis Benign parametrial remnants, no tumor seen Bilateral fallopian tubes, no tumor seen Bilateral ovaries, no tumor seen Synoptic Report: 
Specimen: Uterus, bilateral fallopian tubes and ovaries Procedure: Hysterectomy, bilateral salpingo-oophorectomy Lymph node sampling: None Specimen integrity: Intact hysterectomy specimen Tumor size: 7.0 x 6.0 cm (left lateral); 2.0 (anterior fundus); 1.3cm (posterior) Histologic type: Endometrioid adenocarcinoma Histologic ndgndrndanddndend:nd nd2nd Myometrial invasion: 
Depth of invasion: 0.7cm at the deepest location Myometrial thickness: 1.5cm Involvement of cervix: Not involved Extent of involvement of other organs: 
Right ovary: Not involved Left ovary: Not involved Right fallopian tube: Not involved Left fallopian tube: Not involved Lymphovascular invasion: Present Additional pathologic findings: Leiomyoma, endometriosis Pathologic staging (pTNM): 
Primary tumor (pT): pT1a Regional lymph nodes (pN): pNx Pelvic lymph nodes: 
Number examined: 0 Number involved: N/A Para-aortic lymph nodes: 
Number examined: 0 Number involved: N/A Distant metastasis (M): Not applicable 2. Umbilicus, excision: 
Benign skin and subcutaneous fibroadipose tissue, no histopathologic changes 3. Hernia, herniorrhaphy: 
Benign mesothelial lined fibroadipose tissue with mild chronic inflammation consistent with hernia sac Cytology Review: 
Negative: 2/2018, 8/2017, 2/27/2017, 8/25/2016, 2/2016 Medications: 
  
Current Outpatient Medications on File Prior to Visit Medication Sig Dispense Refill  allopurinol (ZYLOPRIM) 100 mg tablet  ferrous sulfate (IRON) 325 mg (65 mg iron) tablet Take 65 mg by mouth three (3) times daily (with meals).  MULTIVITAMIN (MULTIPLE VITAMINS PO) Take 1 Tab by mouth daily.  ELIQUIS 5 mg tablet  atorvastatin (LIPITOR) 20 mg tablet 20 mg nightly.  diltiazem CD (CARDIZEM CD) 240 mg ER capsule 240 mg daily.  levothyroxine (SYNTHROID) 75 mcg tablet  lisinopril (PRINIVIL, ZESTRIL) 20 mg tablet 20 mg daily.  torsemide (DEMADEX) 20 mg tablet daily.  oxyCODONE-acetaminophen (PERCOCET) 5-325 mg per tablet Take 1 Tab by mouth every four (4) hours as needed for Pain. Max Daily Amount: 6 Tabs. 25 Tab 0 No current facility-administered medications on file prior to visit. Allergies: 
 No Known Allergies Past Medical History:  
Diagnosis Date  Arrhythmia   
 a fib  Arthritis  Gout 06/2017  High cholesterol  History of GI diverticular bleed  Hypertension  Iron deficiency anemia  Morbid obesity ([address]  Thyroid disease Past Surgical History: Procedure Laterality Date  HX COLONOSCOPY    
 HX ENDOSCOPY    
 HX GYN    
 2 vaginal births Social History Socioeconomic History  Marital status:  Spouse name: Not on file  Number of children: Not on file  Years of education: Not on file  Highest education level: Not on file Social Needs  Financial resource strain: Not on file  Food insecurity - worry: Not on file  Food insecurity - inability: Not on file  Transportation needs - medical: Not on file  Transportation needs - non-medical: Not on file Occupational History  Not on file Tobacco Use  Smoking status: Never Smoker  Smokeless tobacco: Never Used Substance and Sexual Activity  Alcohol use: No  
  Alcohol/week: 0.0 oz  Drug use: No  
 Sexual activity: Not Currently Other Topics Concern  Not on file Social History Narrative  Not on file OBJECTIVE: 
Physical Exam: 
Visit Vitals /87 (BP 1 Location: Left arm, BP Patient Position: Sitting) Pulse 76 Ht 5' 6\" (1.676 m) Wt 337 lb (152.9 kg) BMI 54.39 kg/m² General: Alert and oriented. No acute distress. Well-nourished HEENT: No thyroid enlargment. Neck supple without restrictions. Sclera normal. Normal occular motion. Moist mucous membranes. Lymphatics: No evidence of axillary, cervical, or subclavicular adenopathy. Respiratory: clear to auscultation and percussion to the bases. No CVAT. Cardiovascular: regular rate and rhythm. No murmurs, rubs, or gallops. Gastrointestinal: obese, soft, non-tender, non-distended, no masses or organomegaly. Well-healed incisions. Musculoskeletal: normal gait. No joint tenderness, deformity or swelling. No muscular tenderness. Extremities: extremities normal, atraumatic, no cyanosis or edema. Pelvic: exam chaperoned by nurse. Normal appearing external genitalia. On speculum exam, the vagina is atrophic.  The uterus and cervix are surgically absent. No evidence of masses or nodularity on bimanual exam. Deferred rectovaginal exam.  
Neuro: Grossly intact. Normal gait and movement. No acute deficit Skin: No evidence of rashes or skin changes. IMPRESSION/PLAN: 
 
Ms. Anshul Stout is a 68 y.o. female s/p TLH/BSO/staging on 10/19/2015 for Stage Ia, FIGO Grade 1 endometrial cancer. Problem List Items Addressed This Visit Circulatory Atrial fibrillation ([facility] [address] Reproductive Endometrial cancer ([facility] [address] - Primary Other Obesity, morbid, BMI 50 or higher ([facility] [address] Reviewed patient's course to date. SHAKEEL on exam. Reassured patient. RTC in 6 months for continued surveillance. Reviewed precautionary symptoms to return sooner. All questions and concerns were addressed with the patient and she is comfortable with the plan. Basilio Gloria MD 
 
 
CC: 
 Esthela Santa MD 
 No ref. provider found

## 2019-02-12 ENCOUNTER — OFFICE VISIT (OUTPATIENT)
Dept: GYNECOLOGY | Age: 74
End: 2019-02-12

## 2019-02-12 VITALS
BODY MASS INDEX: 47.09 KG/M2 | WEIGHT: 293 LBS | HEIGHT: 66 IN | SYSTOLIC BLOOD PRESSURE: 149 MMHG | DIASTOLIC BLOOD PRESSURE: 87 MMHG | HEART RATE: 76 BPM

## 2019-02-12 DIAGNOSIS — C54.1 ENDOMETRIAL CANCER (HCC): Primary | ICD-10-CM

## 2019-02-12 DIAGNOSIS — I48.91 ATRIAL FIBRILLATION, UNSPECIFIED TYPE (HCC): ICD-10-CM

## 2019-02-12 DIAGNOSIS — E66.01 OBESITY, MORBID, BMI 50 OR HIGHER (HCC): ICD-10-CM

## 2019-02-12 NOTE — PROGRESS NOTES
6 month check up, pt reports a small, scant amount of spotting when she wiped back in December 2018, Initial blood pressure reading 153/89, repeat blood pressure reading 149/87 1. Have you been to the ER, urgent care clinic since your last visit? Hospitalized since your last visit?  no 
 
2. Have you seen or consulted any other health care providers outside of the [address] [place] [address] since your last visit? Include any pap smears or colon screening.    no

## 2019-04-08 ENCOUNTER — TELEPHONE (OUTPATIENT)
Dept: GYNECOLOGY | Age: 74
End: 2019-04-08

## 2019-04-08 NOTE — TELEPHONE ENCOUNTER
S/w pt, advised pap not done at visit in 2/19.   Guidelines have changed, not doing them as often as before, note did indicate SHAKEEL on exam, pt was advised, pt verbalized understanding regular rate and rhythm/no murmur

## 2019-08-12 NOTE — PROGRESS NOTES
[facility] GYNECOLOGIC ONCOLOGY  [address] [facility], [address]  [phone]  [facility] [phone]       Office Note  Patient ID:  Name: Neftaly Lerma  MRM: [id number]  : 1945/73 y.o. Date: 2019      Problem List:   Patient Active Problem List   Diagnosis Code    Atrial fibrillation ([facility] I48.91    Chronic anticoagulation Z79.01    Obesity, morbid, BMI 50 or higher (HCC) E66.01    Endometrial cancer ([facility] [address] C54.1       SUBJECTIVE:  Ms. Neftaly Lerma is a 68 y.o. female s/p TLH/BSO/staging on 10/19/2015 for Stage Ia, FIGO Grade 1 endometrial cancer. Presents today for continued surveillance. Doing well without complaints. Denies vaginal bleeding/discharge, abdominal/pelvic pain, nausea, vomiting, constipation, diarrhea, CP, SOB, hematuria, hematochezia, change in appetite or bowel movements, bloating, fevers, chills, or urinary symptoms. Pathology Review 10/19/2015:  FINAL PATHOLOGIC DIAGNOSIS  1.  Uterus, bilateral fallopian tubes and ovaries, hysterectomy bilateral salpingo-oophorectomy:  Adenocarcinoma, endometrioid type of the uterine corpus, FIGO grade 1 of 3, invading 0.7cm of 1.5cm  multifocal (three foci)  Benign cervix, no tumor seen  Leiomyoma uteri  Endometriosis  Benign parametrial remnants, no tumor seen  Bilateral fallopian tubes, no tumor seen  Bilateral ovaries, no tumor seen  Synoptic Report:  Specimen: Uterus, bilateral fallopian tubes and ovaries  Procedure: Hysterectomy, bilateral salpingo-oophorectomy  Lymph node sampling: None  Specimen integrity: Intact hysterectomy specimen  Tumor size: 7.0 x 6.0 cm (left lateral); 2.0 (anterior fundus); 1.3cm (posterior)  Histologic type: Endometrioid adenocarcinoma  Histologic ndgndrndanddndend:nd nd2nd Myometrial invasion:  Depth of invasion: 0.7cm at the deepest location  Myometrial thickness: 1.5cm  Involvement of cervix: Not involved  Extent of involvement of other organs:  Right ovary: Not involved  Left ovary: Not involved  Right fallopian tube: Not involved  Left fallopian tube: Not involved  Lymphovascular invasion: Present  Additional pathologic findings: Leiomyoma, endometriosis  Pathologic staging (pTNM):  Primary tumor (pT): pT1a  Regional lymph nodes (pN): pNx  Pelvic lymph nodes:  Number examined: 0  Number involved: N/A  Para-aortic lymph nodes:  Number examined: 0  Number involved: N/A  Distant metastasis (M): Not applicable  2. Umbilicus, excision:  Benign skin and subcutaneous fibroadipose tissue, no histopathologic changes  3. Hernia, herniorrhaphy:  Benign mesothelial lined fibroadipose tissue with mild chronic inflammation consistent with hernia sac    Cytology Review:  Negative: 2/2018, 8/2017, 2/27/2017, 8/25/2016, 2/2016      Medications:     Current Outpatient Medications on File Prior to Visit   Medication Sig Dispense Refill    cholecalciferol, vitamin D3, (VITAMIN D3 PO) Take  by mouth.  allopurinol (ZYLOPRIM) 100 mg tablet       ferrous sulfate (IRON) 325 mg (65 mg iron) tablet Take 65 mg by mouth three (3) times daily (with meals).  MULTIVITAMIN (MULTIPLE VITAMINS PO) Take 1 Tab by mouth daily.  ELIQUIS 5 mg tablet       atorvastatin (LIPITOR) 20 mg tablet 20 mg nightly.  diltiazem CD (CARDIZEM CD) 240 mg ER capsule 240 mg daily.  levothyroxine (SYNTHROID) 75 mcg tablet       lisinopril (PRINIVIL, ZESTRIL) 20 mg tablet 20 mg daily.  torsemide (DEMADEX) 20 mg tablet daily.  oxyCODONE-acetaminophen (PERCOCET) 5-325 mg per tablet Take 1 Tab by mouth every four (4) hours as needed for Pain. Max Daily Amount: 6 Tabs. 25 Tab 0     No current facility-administered medications on file prior to visit.       Allergies:   No Known Allergies  Past Medical History:   Diagnosis Date    Arrhythmia     a fib    Arthritis     Gout 06/2017    High cholesterol     History of GI diverticular bleed     Hypertension     Iron deficiency anemia     Morbid obesity ([address]     Thyroid disease      Past Surgical History:   Procedure Laterality Date    HX COLONOSCOPY      HX ENDOSCOPY      HX GYN      2 vaginal births     Social History     Socioeconomic History    Marital status:      Spouse name: Not on file    Number of children: Not on file    Years of education: Not on file    Highest education level: Not on file   Occupational History    Not on file   Social Needs    Financial resource strain: Not on file    Food insecurity:     Worry: Not on file     Inability: Not on file    Transportation needs:     Medical: Not on file     Non-medical: Not on file   Tobacco Use    Smoking status: Never Smoker    Smokeless tobacco: Never Used   Substance and Sexual Activity    Alcohol use: No     Alcohol/week: 0.0 standard drinks    Drug use: No    Sexual activity: Not Currently   Lifestyle    Physical activity:     Days per week: Not on file     Minutes per session: Not on file    Stress: Not on file   Relationships    Social connections:     Talks on phone: Not on file     Gets together: Not on file     Attends Orthodox service: Not on file     Active member of club or organization: Not on file     Attends meetings of clubs or organizations: Not on file     Relationship status: Not on file    Intimate partner violence:     Fear of current or ex partner: Not on file     Emotionally abused: Not on file     Physically abused: Not on file     Forced sexual activity: Not on file   Other Topics Concern    Not on file   Social History Narrative    Not on file       OBJECTIVE:  Physical Exam:  Visit Vitals  /74 (BP 1 Location: Left arm, BP Patient Position: Sitting)   Pulse 90   Ht 5' 6\" (1.676 m)   Wt 333 lb (151 kg)   BMI 53.75 kg/m²      General: Alert and oriented. No acute distress. Well-nourished  HEENT: No thyroid enlargment. Neck supple without restrictions. Sclera normal. Normal occular motion. Moist mucous membranes.   Lymphatics: No evidence of axillary, cervical, or subclavicular adenopathy. Respiratory: clear to auscultation and percussion to the bases. No CVAT. Cardiovascular: regular rate and rhythm. No murmurs, rubs, or gallops. Gastrointestinal: obese, soft, non-tender, non-distended, no masses or organomegaly. Well-healed incisions. Musculoskeletal: normal gait. No joint tenderness, deformity or swelling. No muscular tenderness. Extremities: extremities normal, atraumatic, no cyanosis or edema. Pelvic: exam chaperoned by nurse. Normal appearing external genitalia. On speculum exam, the vagina is atrophic. The uterus and cervix are surgically absent. No evidence of masses or nodularity on bimanual exam. Deferred rectovaginal exam.   Neuro: Grossly intact. Normal gait and movement. No acute deficit  Skin: No evidence of rashes or skin changes. IMPRESSION/PLAN:    Ms. Aiyana Harris is a 68 y.o. female s/p TLH/BSO/staging on 10/19/2015 for Stage Ia, FIGO Grade 1 endometrial cancer. Problem List Items Addressed This Visit        Circulatory    Atrial fibrillation [facility])       Reproductive    Endometrial cancer ([facility] - Primary       Other    Obesity, morbid, BMI 50 or higher ([facility] [address]        Reviewed patient's course to date. SHAKEEL on exam. Reassured patient. RTC in 6 months for continued surveillance. Reviewed precautionary symptoms to return sooner. All questions and concerns were addressed with the patient and she is comfortable with the plan. Carlitos Herrera MD      CC:   Ruth Mcknight MD   No ref.  provider found

## 2019-08-13 ENCOUNTER — OFFICE VISIT (OUTPATIENT)
Dept: GYNECOLOGY | Age: 74
End: 2019-08-13

## 2019-08-13 VITALS
WEIGHT: 293 LBS | SYSTOLIC BLOOD PRESSURE: 137 MMHG | BODY MASS INDEX: 47.09 KG/M2 | HEART RATE: 90 BPM | HEIGHT: 66 IN | DIASTOLIC BLOOD PRESSURE: 74 MMHG

## 2019-08-13 DIAGNOSIS — E66.01 OBESITY, MORBID, BMI 50 OR HIGHER (HCC): ICD-10-CM

## 2019-08-13 DIAGNOSIS — C54.1 ENDOMETRIAL CANCER (HCC): Primary | ICD-10-CM

## 2019-08-13 DIAGNOSIS — I48.91 ATRIAL FIBRILLATION, UNSPECIFIED TYPE (HCC): ICD-10-CM

## 2019-08-13 NOTE — PROGRESS NOTES
6 month check up, pt reports no abnormal spotting or bleeding, pt states she has no questions or concerns for today's visit    1. Have you been to the ER, urgent care clinic since your last visit? Hospitalized since your last visit? Yes, 6/26/19 bleeding hemorrhoid    2. Have you seen or consulted any other health care providers outside of the [address] [place] [address] since your last visit? Include any pap smears or colon screening.    no

## 2019-08-13 NOTE — PATIENT INSTRUCTIONS
[employer] Activation    Thank you for requesting access to [employer]. Please follow the instructions below to securely access and download your online medical record. [employer] allows you to send messages to your doctor, view your test results, renew your prescriptions, schedule appointments, and more. How Do I Sign Up? 1. In your internet browser, go to [email or web address]. 2. Click on the First Time User? Click Here link in the Sign In box. You will see the New Member Sign Up page. 3. Enter your [employer] Access Code exactly as it appears below. You will not need to use this code after youve completed the sign-up process. If you do not sign up before the expiration date, you must request a new code. [employer] Access Code: [email or web address]  Expires: 2019  9:14 AM (This is the date your [employer] access code will )    4. Enter the last four digits of your Social Security Number (xxxx) and Date of Birth (mm/dd/yyyy) as indicated and click Submit. You will be taken to the next sign-up page. 5. Create a [employer] ID. This will be your [employer] login ID and cannot be changed, so think of one that is secure and easy to remember. 6. Create a [employer] password. You can change your password at any time. 7. Enter your Password Reset Question and Answer. This can be used at a later time if you forget your password. 8. Enter your e-mail address. You will receive e-mail notification when new information is available in [address] 9. Click Sign Up. You can now view and download portions of your medical record. 10. Click the Download Summary menu link to download a portable copy of your medical information. Additional Information    If you have questions, please visit the Frequently Asked Questions section of the [employer] website at [email or web address] Remember, [employer] is NOT to be used for urgent needs. For medical emergencies, dial 911.

## 2020-02-10 NOTE — PROGRESS NOTES
[facility]  [address] [place] [address], [address], [address]  [phone]  [facility] [phone]       Office Note  Patient ID:  Name: Dex Beck  MRM: [id number]  :  y.o. Date: 2020      Problem List:   Patient Active Problem List   Diagnosis Code    Atrial fibrillation ([facility] [address] I48.91    Chronic anticoagulation Z79.01    Obesity, morbid, BMI 50 or higher (HCC) E66.01    Endometrial cancer ([facility] [address] C54.1       SUBJECTIVE:  Ms. Dex Beck is a 76 y.o. female s/p TLH/BSO/staging on 10/19/2015 for Stage Ia, FIGO Grade 1 endometrial cancer. Presents today for continued surveillance. Doing well without complaints. Denies vaginal bleeding/discharge, abdominal/pelvic pain, nausea, vomiting, constipation, diarrhea, CP, SOB, hematuria, hematochezia, change in appetite or bowel movements, bloating, fevers, chills, or urinary symptoms. Admitted for a GI bleed to [facility] about 2 weeks ago. Negative colonoscopy and Endoscopy per report. She is currently undergoing a pill study. Remains on aspirin and Plavix for her cardiac history. Reports maybe a small vaginal spotting last Thursday but none since. Noticed while wiping after urinating. Pathology Review 10/19/2015:  FINAL PATHOLOGIC DIAGNOSIS  1.  Uterus, bilateral fallopian tubes and ovaries, hysterectomy bilateral salpingo-oophorectomy:  Adenocarcinoma, endometrioid type of the uterine corpus, FIGO grade 1 of 3, invading 0.7cm of 1.5cm  multifocal (three foci)  Benign cervix, no tumor seen  Leiomyoma uteri  Endometriosis  Benign parametrial remnants, no tumor seen  Bilateral fallopian tubes, no tumor seen  Bilateral ovaries, no tumor seen  Synoptic Report:  Specimen: Uterus, bilateral fallopian tubes and ovaries  Procedure: Hysterectomy, bilateral salpingo-oophorectomy  Lymph node sampling: None  Specimen integrity: Intact hysterectomy specimen  Tumor size: 7.0 x 6.0 cm (left lateral); 2.0 (anterior fundus); 1.3cm (posterior)  Histologic type: Endometrioid adenocarcinoma  Histologic ndgndrndanddndend:nd nd2nd Myometrial invasion:  Depth of invasion: 0.7cm at the deepest location  Myometrial thickness: 1.5cm  Involvement of cervix: Not involved  Extent of involvement of other organs:  Right ovary: Not involved  Left ovary: Not involved  Right fallopian tube: Not involved  Left fallopian tube: Not involved  Lymphovascular invasion: Present  Additional pathologic findings: Leiomyoma, endometriosis  Pathologic staging (pTNM):  Primary tumor (pT): pT1a  Regional lymph nodes (pN): pNx  Pelvic lymph nodes:  Number examined: 0  Number involved: N/A  Para-aortic lymph nodes:  Number examined: 0  Number involved: N/A  Distant metastasis (M): Not applicable  2. Umbilicus, excision:  Benign skin and subcutaneous fibroadipose tissue, no histopathologic changes  3. Hernia, herniorrhaphy:  Benign mesothelial lined fibroadipose tissue with mild chronic inflammation consistent with hernia sac    Cytology Review:  Negative: 2/2018, 8/2017, 2/27/2017, 8/25/2016, 2/2016      Medications:     Current Outpatient Medications on File Prior to Visit   Medication Sig Dispense Refill    pantoprazole (PROTONIX) 40 mg tablet Take 40 mg by mouth.  aspirin delayed-release 81 mg tablet Take  by mouth daily.  clopidogreL (PLAVIX) 75 mg tab Take  by mouth.  cholecalciferol, vitamin D3, (VITAMIN D3 PO) Take  by mouth.  allopurinol (ZYLOPRIM) 100 mg tablet       ferrous sulfate (IRON) 325 mg (65 mg iron) tablet Take 65 mg by mouth three (3) times daily (with meals).  MULTIVITAMIN (MULTIPLE VITAMINS PO) Take 1 Tab by mouth daily.  atorvastatin (LIPITOR) 20 mg tablet 20 mg nightly.  diltiazem CD (CARDIZEM CD) 240 mg ER capsule 240 mg daily.  levothyroxine (SYNTHROID) 75 mcg tablet       lisinopril (PRINIVIL, ZESTRIL) 20 mg tablet 20 mg daily.  torsemide (DEMADEX) 20 mg tablet daily.       oxyCODONE-acetaminophen (PERCOCET) 5-325 mg per tablet Take 1 Tab by mouth every four (4) hours as needed for Pain. Max Daily Amount: 6 Tabs. 25 Tab 0    ELIQUIS 5 mg tablet        No current facility-administered medications on file prior to visit.       Allergies:   No Known Allergies  Past Medical History:   Diagnosis Date    Arrhythmia     a fib    Arthritis     Gout 06/2017    High cholesterol     History of GI diverticular bleed     Hypertension     Iron deficiency anemia     Morbid obesity ([facility] [address]     Thyroid disease      Past Surgical History:   Procedure Laterality Date    HX COLONOSCOPY      HX ENDOSCOPY      HX GYN      2 vaginal births     Social History     Socioeconomic History    Marital status:      Spouse name: Not on file    Number of children: Not on file    Years of education: Not on file    Highest education level: Not on file   Occupational History    Not on file   Social Needs    Financial resource strain: Not on file    Food insecurity:     Worry: Not on file     Inability: Not on file    Transportation needs:     Medical: Not on file     Non-medical: Not on file   Tobacco Use    Smoking status: Never Smoker    Smokeless tobacco: Never Used   Substance and Sexual Activity    Alcohol use: No     Alcohol/week: 0.0 standard drinks    Drug use: No    Sexual activity: Not Currently   Lifestyle    Physical activity:     Days per week: Not on file     Minutes per session: Not on file    Stress: Not on file   Relationships    Social connections:     Talks on phone: Not on file     Gets together: Not on file     Attends [employer] service: Not on file     Active member of club or organization: Not on file     Attends meetings of clubs or organizations: Not on file     Relationship status: Not on file    Intimate partner violence:     Fear of current or ex partner: Not on file     Emotionally abused: Not on file     Physically abused: Not on file     Forced sexual activity: Not on file   Other Topics Concern    Not on file   Social History Narrative    Not on file       OBJECTIVE:  Physical Exam:  Visit Vitals  /80 (BP 1 Location: Left arm, BP Patient Position: Sitting)   Pulse 86   Ht 5' 6\" (1.676 m)   Wt 325 lb 12.8 oz (147.8 kg)   BMI 52.59 kg/m²      General: Alert and oriented. No acute distress. Well-nourished  HEENT: No thyroid enlargment. Neck supple without restrictions. Sclera normal. Normal occular motion. Moist mucous membranes. Lymphatics: No evidence of axillary, cervical, or subclavicular adenopathy. Respiratory: clear to auscultation and percussion to the bases. No CVAT. Cardiovascular: regular rate and rhythm. No murmurs, rubs, or gallops. Gastrointestinal: obese, soft, non-tender, non-distended, no masses or organomegaly. Well-healed incisions. Musculoskeletal: normal gait. No joint tenderness, deformity or swelling. No muscular tenderness. Extremities: extremities normal, atraumatic, no cyanosis or edema. Pelvic: exam chaperoned by nurse. Normal appearing external genitalia. On speculum exam, the vagina is atrophic. The uterus and cervix are surgically absent. No evidence of masses or nodularity on bimanual exam. Smooth walls of the vagina. Deferred rectovaginal exam.   Neuro: Grossly intact. Normal gait and movement. No acute deficit  Skin: No evidence of rashes or skin changes. IMPRESSION/PLAN:    Ms. Carlos Beaver is a 76 y.o. female s/p TLH/BSO/staging on 10/19/2015 for Stage Ia, FIGO Grade 1 endometrial cancer. Problem List Items Addressed This Visit        Reproductive    Endometrial cancer ([address] - Primary       Other    Obesity, morbid, BMI 50 or higher ([address]        Reviewed patient's course to date. SHAKEEL on exam. Reassured patient. RTC in 6 months for continued surveillance. Reviewed precautionary symptoms to return sooner. All questions and concerns were addressed with the patient and she is comfortable with the plan.     Serina Isbell, MD      CC:   Ellen Brice MD   No ref.  provider found

## 2020-02-11 ENCOUNTER — OFFICE VISIT (OUTPATIENT)
Dept: GYNECOLOGY | Age: 75
End: 2020-02-11

## 2020-02-11 VITALS
DIASTOLIC BLOOD PRESSURE: 80 MMHG | HEIGHT: 66 IN | BODY MASS INDEX: 47.09 KG/M2 | SYSTOLIC BLOOD PRESSURE: 146 MMHG | HEART RATE: 86 BPM | WEIGHT: 293 LBS

## 2020-02-11 DIAGNOSIS — C54.1 ENDOMETRIAL CANCER (HCC): Primary | ICD-10-CM

## 2020-02-11 DIAGNOSIS — E66.01 OBESITY, MORBID, BMI 50 OR HIGHER (HCC): ICD-10-CM

## 2020-02-11 RX ORDER — ASPIRIN 81 MG/1
TABLET ORAL DAILY
COMMUNITY

## 2020-02-11 RX ORDER — PANTOPRAZOLE SODIUM 40 MG/1
40 TABLET, DELAYED RELEASE ORAL
COMMUNITY
Start: 2020-02-01 | End: 2020-03-02

## 2020-02-11 RX ORDER — CLOPIDOGREL BISULFATE 75 MG/1
TABLET ORAL
COMMUNITY

## 2020-02-11 NOTE — PROGRESS NOTES
6 month follow up for endometrial cancer ,  pt reports she had some spotting last Thursday, it since has resolved      1. Have you been to the ER, urgent care clinic since your last visit? Hospitalized since your last visit?  no  no  2. Have you seen or consulted any other health care providers outside of the [address] [place] [address] since your last visit? Include any pap smears or colon screening.    no

## 2020-08-11 ENCOUNTER — VIRTUAL VISIT (OUTPATIENT)
Dept: GYNECOLOGY | Age: 75
End: 2020-08-11
Payer: MEDICARE

## 2020-08-11 DIAGNOSIS — E66.01 OBESITY, MORBID, BMI 50 OR HIGHER (HCC): ICD-10-CM

## 2020-08-11 DIAGNOSIS — C54.1 ENDOMETRIAL CANCER (HCC): Primary | ICD-10-CM

## 2020-08-11 PROCEDURE — 99213 OFFICE O/P EST LOW 20 MIN: CPT | Performed by: OBSTETRICS & GYNECOLOGY

## 2020-08-11 NOTE — PROGRESS NOTES
Gerson Ruiz is a 76 y.o. female who was seen by synchronous (real-time) audio-video technology on 2020       [facility]  [address] [place] [address], [address], [address]  [phone]  [facility] [phone]       Office Note  Patient ID:  Name: Gerson Ruiz  MRM: [id number]  :  y.o. Date: 2020      Problem List:   Patient Active Problem List   Diagnosis Code    Atrial fibrillation ([facility] [address] I48.91    Chronic anticoagulation Z79.01    Obesity, morbid, BMI 50 or higher (HCC) E66.01    Endometrial cancer ([facility] [address] C54.1       SUBJECTIVE:  Ms. Gerson Ruiz is a 76 y.o. female s/p TLH/BSO/staging on 10/19/2015 for Stage Ia, FIGO Grade 1 endometrial cancer. Presents today for continued surveillance. Doing well without complaints. Denies vaginal bleeding/discharge, abdominal/pelvic pain, nausea, vomiting, constipation, diarrhea, CP, SOB, hematuria, hematochezia, change in appetite or bowel movements, bloating, fevers, chills, or urinary symptoms. Remains on aspirin and Plavix for her cardiac history. Reports maybe a small vaginal spotting rarely. Noticed while wiping after urinating. Pathology Review 10/19/2015:  FINAL PATHOLOGIC DIAGNOSIS  1.  Uterus, bilateral fallopian tubes and ovaries, hysterectomy bilateral salpingo-oophorectomy:  Adenocarcinoma, endometrioid type of the uterine corpus, FIGO grade 1 of 3, invading 0.7cm of 1.5cm  multifocal (three foci)  Benign cervix, no tumor seen  Leiomyoma uteri  Endometriosis  Benign parametrial remnants, no tumor seen  Bilateral fallopian tubes, no tumor seen  Bilateral ovaries, no tumor seen  Synoptic Report:  Specimen: Uterus, bilateral fallopian tubes and ovaries  Procedure: Hysterectomy, bilateral salpingo-oophorectomy  Lymph node sampling: None  Specimen integrity: Intact hysterectomy specimen  Tumor size: 7.0 x 6.0 cm (left lateral); 2.0 (anterior fundus); 1.3cm (posterior)  Histologic type: Endometrioid adenocarcinoma  Histologic ndgndrndanddndend:nd nd2nd Myometrial invasion:  Depth of invasion: 0.7cm at the deepest location  Myometrial thickness: 1.5cm  Involvement of cervix: Not involved  Extent of involvement of other organs:  Right ovary: Not involved  Left ovary: Not involved  Right fallopian tube: Not involved  Left fallopian tube: Not involved  Lymphovascular invasion: Present  Additional pathologic findings: Leiomyoma, endometriosis  Pathologic staging (pTNM):  Primary tumor (pT): pT1a  Regional lymph nodes (pN): pNx  Pelvic lymph nodes:  Number examined: 0  Number involved: N/A  Para-aortic lymph nodes:  Number examined: 0  Number involved: N/A  Distant metastasis (M): Not applicable  2. Umbilicus, excision:  Benign skin and subcutaneous fibroadipose tissue, no histopathologic changes  3. Hernia, herniorrhaphy:  Benign mesothelial lined fibroadipose tissue with mild chronic inflammation consistent with hernia sac    Cytology Review:  Negative: 2/2018, 8/2017, 2/27/2017, 8/25/2016, 2/2016      Medications:     Current Outpatient Medications on File Prior to Visit   Medication Sig Dispense Refill    OMEPRAZOLE PO Take  by mouth.  aspirin delayed-release 81 mg tablet Take  by mouth daily.  clopidogreL (PLAVIX) 75 mg tab Take  by mouth.  cholecalciferol, vitamin D3, (VITAMIN D3 PO) Take  by mouth.  allopurinol (ZYLOPRIM) 100 mg tablet       ferrous sulfate (IRON) 325 mg (65 mg iron) tablet Take 65 mg by mouth three (3) times daily (with meals).  MULTIVITAMIN (MULTIPLE VITAMINS PO) Take 1 Tab by mouth daily.  atorvastatin (LIPITOR) 20 mg tablet 20 mg nightly.  diltiazem CD (CARDIZEM CD) 240 mg ER capsule 240 mg daily.  levothyroxine (SYNTHROID) 75 mcg tablet       lisinopril (PRINIVIL, ZESTRIL) 20 mg tablet 20 mg daily.  torsemide (DEMADEX) 20 mg tablet daily.       oxyCODONE-acetaminophen (PERCOCET) 5-325 mg per tablet Take 1 Tab by mouth every four (4) hours as needed for Pain. Max Daily Amount: 6 Tabs. 25 Tab 0    ELIQUIS 5 mg tablet        No current facility-administered medications on file prior to visit.       Allergies:   No Known Allergies  Past Medical History:   Diagnosis Date    Arrhythmia     a fib    Arthritis     Gout 06/2017    High cholesterol     History of GI diverticular bleed     Hypertension     Iron deficiency anemia     Morbid obesity ([facility] [address]     Thyroid disease      Past Surgical History:   Procedure Laterality Date    HX COLONOSCOPY      HX ENDOSCOPY      HX GYN      2 vaginal births     Social History     Socioeconomic History    Marital status:      Spouse name: Not on file    Number of children: Not on file    Years of education: Not on file    Highest education level: Not on file   Occupational History    Not on file   Social Needs    Financial resource strain: Not on file    Food insecurity     Worry: Not on file     Inability: Not on file    Transportation needs     Medical: Not on file     Non-medical: Not on file   Tobacco Use    Smoking status: Never Smoker    Smokeless tobacco: Never Used   Substance and Sexual Activity    Alcohol use: No     Alcohol/week: 0.0 standard drinks    Drug use: No    Sexual activity: Not Currently   Lifestyle    Physical activity     Days per week: Not on file     Minutes per session: Not on file    Stress: Not on file   Relationships    Social connections     Talks on phone: Not on file     Gets together: Not on file     Attends [employer] service: Not on file     Active member of club or organization: Not on file     Attends meetings of clubs or organizations: Not on file     Relationship status: Not on file    Intimate partner violence     Fear of current or ex partner: Not on file     Emotionally abused: Not on file     Physically abused: Not on file     Forced sexual activity: Not on file   Other Topics Concern    Not on file   Social History Narrative    Not on file OBJECTIVE:  *deferred today given video-conference visit for ongoing COVID-19 pandemic*       Physical Exam:  There were no vitals taken for this visit. General: Alert and oriented. No acute distress. Well-nourished  HEENT: No thyroid enlargment. Neck supple without restrictions. Sclera normal. Normal occular motion. Moist mucous membranes. Lymphatics: No evidence of axillary, cervical, or subclavicular adenopathy. Respiratory: clear to auscultation and percussion to the bases. No CVAT. Cardiovascular: regular rate and rhythm. No murmurs, rubs, or gallops. Gastrointestinal: obese, soft, non-tender, non-distended, no masses or organomegaly. Well-healed incisions. Musculoskeletal: normal gait. No joint tenderness, deformity or swelling. No muscular tenderness. Extremities: extremities normal, atraumatic, no cyanosis or edema. Pelvic: exam chaperoned by nurse. Normal appearing external genitalia. On speculum exam, the vagina is atrophic. The uterus and cervix are surgically absent. No evidence of masses or nodularity on bimanual exam. Smooth walls of the vagina. Deferred rectovaginal exam.   Neuro: Grossly intact. Normal gait and movement. No acute deficit  Skin: No evidence of rashes or skin changes. IMPRESSION/PLAN:    Ms. Maisha Singh is a 76 y.o. female s/p TLH/BSO/staging on 10/19/2015 for Stage Ia, FIGO Grade 1 endometrial cancer. Problem List Items Addressed This Visit        Reproductive    Endometrial cancer ([facility] [address] - Primary       Other    Obesity, morbid, BMI 50 or higher ([facility]        Reviewed patient's course to date. Patient without complaints. Given the ongoing Covid-19 pandemic, today's visit was a virtual visit; and a physical exam was not performed. Reassured patient. RTC in 6 months for continued surveillance. Reviewed precautionary symptoms to return sooner. All questions and concerns were addressed with the patient and she is comfortable with the plan.     Alexandria Torres Clint Berry MD      CC:   MD Wendy Cuevas MD        Pursuant to the emergency declaration unde the [address] [place] [address], [facility] waiver authority and the [employer] Act, this Virtual Visit was conducted, with patient's consent, to reduce the patient's risk of exposure to COVID-19 and provide continuity of care for an established patient. Services were provided through a video synchronous discussion virtually to substitute for in-person clinic visit.      I spent at least 15 minutes with this established patient, and >50% of the time was spent counseling and/or coordinating care regarding endometrial cancer    Austin Huerta MD

## 2020-08-11 NOTE — PROGRESS NOTES
6 month follow up for endometrial cancer, pt states she does have some very light vaginal spotting \"every once in a while\"    1. Have you been to the ER, urgent care clinic since your last visit? Hospitalized since your last visit?  no    2. Have you seen or consulted any other health care providers outside of the [facility] since your last visit? Include any pap smears or colon screening.    no

## 2021-02-10 ENCOUNTER — OFFICE VISIT (OUTPATIENT)
Dept: GYNECOLOGY | Age: 76
End: 2021-02-10
Payer: MEDICARE

## 2021-02-10 VITALS
HEART RATE: 68 BPM | BODY MASS INDEX: 45.48 KG/M2 | WEIGHT: 283 LBS | DIASTOLIC BLOOD PRESSURE: 81 MMHG | SYSTOLIC BLOOD PRESSURE: 133 MMHG | HEIGHT: 66 IN | TEMPERATURE: 96.9 F

## 2021-02-10 DIAGNOSIS — C54.1 ENDOMETRIAL CANCER (HCC): Primary | ICD-10-CM

## 2021-02-10 PROCEDURE — G8417 CALC BMI ABV UP PARAM F/U: HCPCS | Performed by: OBSTETRICS & GYNECOLOGY

## 2021-02-10 PROCEDURE — G8400 PT W/DXA NO RESULTS DOC: HCPCS | Performed by: OBSTETRICS & GYNECOLOGY

## 2021-02-10 PROCEDURE — G8536 NO DOC ELDER MAL SCRN: HCPCS | Performed by: OBSTETRICS & GYNECOLOGY

## 2021-02-10 PROCEDURE — G8427 DOCREV CUR MEDS BY ELIG CLIN: HCPCS | Performed by: OBSTETRICS & GYNECOLOGY

## 2021-02-10 PROCEDURE — G8432 DEP SCR NOT DOC, RNG: HCPCS | Performed by: OBSTETRICS & GYNECOLOGY

## 2021-02-10 PROCEDURE — G0463 HOSPITAL OUTPT CLINIC VISIT: HCPCS | Performed by: OBSTETRICS & GYNECOLOGY

## 2021-02-10 PROCEDURE — 1090F PRES/ABSN URINE INCON ASSESS: CPT | Performed by: OBSTETRICS & GYNECOLOGY

## 2021-02-10 PROCEDURE — 3017F COLORECTAL CA SCREEN DOC REV: CPT | Performed by: OBSTETRICS & GYNECOLOGY

## 2021-02-10 PROCEDURE — 1101F PT FALLS ASSESS-DOCD LE1/YR: CPT | Performed by: OBSTETRICS & GYNECOLOGY

## 2021-02-10 PROCEDURE — 99213 OFFICE O/P EST LOW 20 MIN: CPT | Performed by: OBSTETRICS & GYNECOLOGY

## 2021-02-10 NOTE — PROGRESS NOTES
6 month follow up for endometrial cancer ,  pt reports no abnormal spotting or bleeding, pt states she has no questions or concerns for today's visit, pt states weight loss has been intentional    Vitals:    02/10/21 0914 02/10/21 0918   BP: (!) 155/84 133/81   BP 1 Location: Left lower arm Left lower arm   BP Patient Position: Sitting Sitting   Pulse: 81 68   Temp: 96.9 °F (36.1 °C)    TempSrc: Temporal    Weight: 283 lb (128.4 kg)    Height: 5' 6\" (1.676 m)          1. Have you been to the ER, urgent care clinic since your last visit? Hospitalized since your last visit?  no    2. Have you seen or consulted any other health care providers outside of the [address] [place] [address] since your last visit? Include any pap smears or colon screening.    no

## 2021-02-10 NOTE — PROGRESS NOTES
[facility]  [address] [place] [address], [address], [address] [facility]  [phone]  [facility] [phone]       Office Note  Patient ID:  Name: Ramirez Atkinson  MRM: [id number]  :  y.o. Date: 2/10/2021      Problem List:   Patient Active Problem List   Diagnosis Code    Atrial fibrillation ([facility] I48.91    Chronic anticoagulation Z79.01    Obesity, morbid, BMI 50 or higher (HCC) E66.01    Endometrial cancer ([facility] C54.1       SUBJECTIVE:  Ms. Ramirez Atkinson is a 76 y.o. female, an established patient, s/p TLH/BSO/staging on 10/19/2015 for Stage Ia, FIGO Grade 1 endometrial cancer. Presents today for continued surveillance. Doing well without complaints. Denies vaginal bleeding/discharge, abdominal/pelvic pain, nausea, vomiting, constipation, diarrhea, CP, SOB, hematuria, hematochezia, change in appetite or bowel movements, bloating, fevers, chills, or urinary symptoms. Remains on aspirin and Plavix for her cardiac history. Reports maybe a small vaginal spotting rarely. Noticed while wiping after urinating. Pathology Review 10/19/2015:  FINAL PATHOLOGIC DIAGNOSIS  1.  Uterus, bilateral fallopian tubes and ovaries, hysterectomy bilateral salpingo-oophorectomy:  Adenocarcinoma, endometrioid type of the uterine corpus, FIGO grade 1 of 3, invading 0.7cm of 1.5cm  multifocal (three foci)  Benign cervix, no tumor seen  Leiomyoma uteri  Endometriosis  Benign parametrial remnants, no tumor seen  Bilateral fallopian tubes, no tumor seen  Bilateral ovaries, no tumor seen  Synoptic Report:  Specimen: Uterus, bilateral fallopian tubes and ovaries  Procedure: Hysterectomy, bilateral salpingo-oophorectomy  Lymph node sampling: None  Specimen integrity: Intact hysterectomy specimen  Tumor size: 7.0 x 6.0 cm (left lateral); 2.0 (anterior fundus); 1.3cm (posterior)  Histologic type: Endometrioid adenocarcinoma  Histologic ndgndrndanddndend:nd nd2nd Myometrial invasion:  Depth of invasion: 0.7cm at the deepest location  Myometrial thickness: 1.5cm  Involvement of cervix: Not involved  Extent of involvement of other organs:  Right ovary: Not involved  Left ovary: Not involved  Right fallopian tube: Not involved  Left fallopian tube: Not involved  Lymphovascular invasion: Present  Additional pathologic findings: Leiomyoma, endometriosis  Pathologic staging (pTNM):  Primary tumor (pT): pT1a  Regional lymph nodes (pN): pNx  Pelvic lymph nodes:  Number examined: 0  Number involved: N/A  Para-aortic lymph nodes:  Number examined: 0  Number involved: N/A  Distant metastasis (M): Not applicable  2. Umbilicus, excision:  Benign skin and subcutaneous fibroadipose tissue, no histopathologic changes  3. Hernia, herniorrhaphy:  Benign mesothelial lined fibroadipose tissue with mild chronic inflammation consistent with hernia sac    Cytology Review:  Negative: 2/2018, 8/2017, 2/27/2017, 8/25/2016, 2/2016      Medications:     Current Outpatient Medications on File Prior to Visit   Medication Sig Dispense Refill    aspirin delayed-release 81 mg tablet Take  by mouth daily.  clopidogreL (PLAVIX) 75 mg tab Take  by mouth.  cholecalciferol, vitamin D3, (VITAMIN D3 PO) Take  by mouth.  allopurinol (ZYLOPRIM) 100 mg tablet       ferrous sulfate (IRON) 325 mg (65 mg iron) tablet Take 65 mg by mouth three (3) times daily (with meals).  MULTIVITAMIN (MULTIPLE VITAMINS PO) Take 1 Tab by mouth daily.  atorvastatin (LIPITOR) 20 mg tablet 20 mg nightly.  diltiazem CD (CARDIZEM CD) 240 mg ER capsule 240 mg daily.  levothyroxine (SYNTHROID) 75 mcg tablet       torsemide (DEMADEX) 20 mg tablet daily.  OMEPRAZOLE PO Take  by mouth.  oxyCODONE-acetaminophen (PERCOCET) 5-325 mg per tablet Take 1 Tab by mouth every four (4) hours as needed for Pain. Max Daily Amount: 6 Tabs. 25 Tab 0    ELIQUIS 5 mg tablet       lisinopril (PRINIVIL, ZESTRIL) 20 mg tablet 20 mg daily.        No current facility-administered medications on file prior to visit.       Allergies:   No Known Allergies  Past Medical History:   Diagnosis Date    Arrhythmia     a fib    Arthritis     Gout 06/2017    High cholesterol     History of GI diverticular bleed     Hypertension     Iron deficiency anemia     Morbid obesity ([facility]     Thyroid disease      Past Surgical History:   Procedure Laterality Date    HX COLONOSCOPY      HX ENDOSCOPY      HX GYN      2 vaginal births     Social History     Socioeconomic History    Marital status:      Spouse name: Not on file    Number of children: Not on file    Years of education: Not on file    Highest education level: Not on file   Occupational History    Not on file   Social Needs    Financial resource strain: Not on file    Food insecurity     Worry: Not on file     Inability: Not on file    Transportation needs     Medical: Not on file     Non-medical: Not on file   Tobacco Use    Smoking status: Never Smoker    Smokeless tobacco: Never Used   Substance and Sexual Activity    Alcohol use: No     Alcohol/week: 0.0 standard drinks    Drug use: No    Sexual activity: Not Currently   Lifestyle    Physical activity     Days per week: Not on file     Minutes per session: Not on file    Stress: Not on file   Relationships    Social connections     Talks on phone: Not on file     Gets together: Not on file     Attends Roman [employer] service: Not on file     Active member of club or organization: Not on file     Attends meetings of clubs or organizations: Not on file     Relationship status: Not on file    Intimate partner violence     Fear of current or ex partner: Not on file     Emotionally abused: Not on file     Physically abused: Not on file     Forced sexual activity: Not on file   Other Topics Concern    Not on file   Social History Narrative    Not on file       OBJECTIVE:    Physical Exam:  Visit Vitals  /81 (BP 1 Location: Left lower arm, BP Patient Position: Sitting)   Pulse 68   Temp 96.9 °F (36.1 °C) (Temporal)   Ht 5' 6\" (1.676 m)   Wt 283 lb (128.4 kg)   BMI 45.68 kg/m²      General: Alert and oriented. No acute distress. Well-nourished  HEENT: No thyroid enlargment. Neck supple without restrictions. Sclera normal. Normal occular motion. Moist mucous membranes.  Lymphatics: No evidence of axillary, cervical, or subclavicular adenopathy.   Respiratory: clear to auscultation and percussion to the bases. No CVAT.  Cardiovascular: regular rate and rhythm. No murmurs, rubs, or gallops.  Gastrointestinal: obese, soft, non-tender, non-distended, no masses or organomegaly. Well-healed incisions.   Musculoskeletal: normal gait. No joint tenderness, deformity or swelling. No muscular tenderness.   Extremities: extremities normal, atraumatic, no cyanosis or edema.  Pelvic: exam chaperoned by nurse. Normal appearing external genitalia. On speculum exam, the vagina is atrophic. The uterus and cervix are surgically absent. No evidence of masses or nodularity on bimanual exam. Smooth walls of the vagina. Deferred rectovaginal exam.   Neuro: Grossly intact. Normal gait and movement. No acute deficit  Skin: No evidence of rashes or skin changes.       IMPRESSION/PLAN:    Ms. Ele Garza is a 75 y.o. female s/p TLH/BSO/staging on 10/19/2015 for Stage Ia, FIGO Grade 1 endometrial cancer.     Problem List Items Addressed This Visit        Reproductive    Endometrial cancer (HCC) - Primary        Reviewed patient's course to date. SHAKEEL on exam. Reassured patient. RTC in 1 year for continued surveillance.  Reviewed precautionary symptoms to return sooner. All questions and concerns were addressed with the patient and she is comfortable with the plan.    An electronic signature was used to authenticate this note.     Leo Ryan MD        CC:   Kenney Woo MD   No ref. provider found

## 2022-02-18 NOTE — PROGRESS NOTES
One year follow up for endometrial cancer, pt reports no abnormal spotting or bleeding, pt states she has no questions or concerns for today's visit    1. Have you been to the ER, urgent care clinic since your last visit? Hospitalized since your last visit?  no    2. Have you seen or consulted any other health care providers outside of the [address] [place] [address] since your last visit? Include any pap smears or colon screening.    no

## 2022-02-22 ENCOUNTER — OFFICE VISIT (OUTPATIENT)
Dept: GYNECOLOGY | Age: 77
End: 2022-02-22
Payer: MEDICARE

## 2022-02-22 VITALS
WEIGHT: 293 LBS | HEART RATE: 71 BPM | BODY MASS INDEX: 47.09 KG/M2 | SYSTOLIC BLOOD PRESSURE: 150 MMHG | DIASTOLIC BLOOD PRESSURE: 73 MMHG | HEIGHT: 66 IN

## 2022-02-22 DIAGNOSIS — C54.1 ENDOMETRIAL CANCER (HCC): Primary | ICD-10-CM

## 2022-02-22 PROCEDURE — 99213 OFFICE O/P EST LOW 20 MIN: CPT | Performed by: OBSTETRICS & GYNECOLOGY

## 2022-02-22 PROCEDURE — G8427 DOCREV CUR MEDS BY ELIG CLIN: HCPCS | Performed by: OBSTETRICS & GYNECOLOGY

## 2022-02-22 PROCEDURE — G8536 NO DOC ELDER MAL SCRN: HCPCS | Performed by: OBSTETRICS & GYNECOLOGY

## 2022-02-22 PROCEDURE — G8432 DEP SCR NOT DOC, RNG: HCPCS | Performed by: OBSTETRICS & GYNECOLOGY

## 2022-02-22 PROCEDURE — G8400 PT W/DXA NO RESULTS DOC: HCPCS | Performed by: OBSTETRICS & GYNECOLOGY

## 2022-02-22 PROCEDURE — G8417 CALC BMI ABV UP PARAM F/U: HCPCS | Performed by: OBSTETRICS & GYNECOLOGY

## 2022-02-22 PROCEDURE — G0463 HOSPITAL OUTPT CLINIC VISIT: HCPCS | Performed by: OBSTETRICS & GYNECOLOGY

## 2022-02-22 PROCEDURE — 1101F PT FALLS ASSESS-DOCD LE1/YR: CPT | Performed by: OBSTETRICS & GYNECOLOGY

## 2022-02-22 PROCEDURE — 1090F PRES/ABSN URINE INCON ASSESS: CPT | Performed by: OBSTETRICS & GYNECOLOGY

## 2022-02-22 NOTE — PROGRESS NOTES
[facility]  [address] [facility], [address], [address]  [phone]  [facility] [phone]       Office Note  Patient ID:  Name: Ike Grewal  MRM: [id number]  : 76 y.o. Date: 2022      Problem List:   Patient Active Problem List   Diagnosis Code    Atrial fibrillation ([facility] I48.91    Chronic anticoagulation Z79.01    Obesity, morbid, BMI 50 or higher (HCC) E66.01    Endometrial cancer ([facility] C54.1       SUBJECTIVE:  Ms. Ike Grewal is a 68 y.o. female, an established patient, s/p TLH/BSO/staging on 10/19/2015 for Stage Ia, FIGO Grade 1 endometrial cancer. Presents today for continued surveillance. Doing well without complaints. Denies vaginal bleeding/discharge, abdominal/pelvic pain, nausea, vomiting, constipation, diarrhea, CP, SOB, hematuria, hematochezia, change in appetite or bowel movements, bloating, fevers, chills, or urinary symptoms. Remains on aspirin and Plavix for her cardiac history. Reports maybe a small vaginal spotting rarely. Noticed while wiping after urinating. Pathology Review 10/19/2015:  FINAL PATHOLOGIC DIAGNOSIS  1.  Uterus, bilateral fallopian tubes and ovaries, hysterectomy bilateral salpingo-oophorectomy:  Adenocarcinoma, endometrioid type of the uterine corpus, FIGO grade 1 of 3, invading 0.7cm of 1.5cm  multifocal (three foci)  Benign cervix, no tumor seen  Leiomyoma uteri  Endometriosis  Benign parametrial remnants, no tumor seen  Bilateral fallopian tubes, no tumor seen  Bilateral ovaries, no tumor seen  Synoptic Report:  Specimen: Uterus, bilateral fallopian tubes and ovaries  Procedure: Hysterectomy, bilateral salpingo-oophorectomy  Lymph node sampling: None  Specimen integrity: Intact hysterectomy specimen  Tumor size: 7.0 x 6.0 cm (left lateral); 2.0 (anterior fundus); 1.3cm (posterior)  Histologic type: Endometrioid adenocarcinoma  Histologic ndgndrndanddndend:nd nd2nd Myometrial invasion:  Depth of invasion: 0.7cm at the deepest location  Myometrial thickness: 1.5cm  Involvement of cervix: Not involved  Extent of involvement of other organs:  Right ovary: Not involved  Left ovary: Not involved  Right fallopian tube: Not involved  Left fallopian tube: Not involved  Lymphovascular invasion: Present  Additional pathologic findings: Leiomyoma, endometriosis  Pathologic staging (pTNM):  Primary tumor (pT): pT1a  Regional lymph nodes (pN): pNx  Pelvic lymph nodes:  Number examined: 0  Number involved: N/A  Para-aortic lymph nodes:  Number examined: 0  Number involved: N/A  Distant metastasis (M): Not applicable  2. Umbilicus, excision:  Benign skin and subcutaneous fibroadipose tissue, no histopathologic changes  3. Hernia, herniorrhaphy:  Benign mesothelial lined fibroadipose tissue with mild chronic inflammation consistent with hernia sac    Cytology Review:  Negative: 2/2018, 8/2017, 2/27/2017, 8/25/2016, 2/2016      Medications:     Current Outpatient Medications on File Prior to Visit   Medication Sig Dispense Refill    aspirin delayed-release 81 mg tablet Take  by mouth daily.  cholecalciferol, vitamin D3, (VITAMIN D3 PO) Take  by mouth.  allopurinol (ZYLOPRIM) 100 mg tablet       ferrous sulfate (IRON) 325 mg (65 mg iron) tablet Take 65 mg by mouth three (3) times daily (with meals).  MULTIVITAMIN (MULTIPLE VITAMINS PO) Take 1 Tab by mouth daily.  ELIQUIS 5 mg tablet       atorvastatin (LIPITOR) 20 mg tablet 20 mg nightly.  diltiazem CD (CARDIZEM CD) 240 mg ER capsule 240 mg daily.  levothyroxine (SYNTHROID) 75 mcg tablet       torsemide (DEMADEX) 20 mg tablet daily.  OMEPRAZOLE PO Take  by mouth. (Patient not taking: Reported on 2/22/2022)      clopidogreL (PLAVIX) 75 mg tab Take  by mouth. (Patient not taking: Reported on 2/22/2022)      oxyCODONE-acetaminophen (PERCOCET) 5-325 mg per tablet Take 1 Tab by mouth every four (4) hours as needed for Pain. Max Daily Amount: 6 Tabs.  (Patient not taking: Reported on 2/22/2022) 25 Tab 0    lisinopril (PRINIVIL, ZESTRIL) 20 mg tablet 20 mg daily. (Patient not taking: Reported on 2/22/2022)       No current facility-administered medications on file prior to visit. Allergies:   No Known Allergies  Past Medical History:   Diagnosis Date    Arrhythmia     a fib    Arthritis     Gout 06/2017    High cholesterol     History of GI diverticular bleed     Hypertension     Iron deficiency anemia     Morbid obesity ([address]     Thyroid disease      Past Surgical History:   Procedure Laterality Date    HX COLONOSCOPY      HX ENDOSCOPY      HX GYN      2 vaginal births     Social History     Socioeconomic History    Marital status:      Spouse name: Not on file    Number of children: Not on file    Years of education: Not on file    Highest education level: Not on file   Occupational History    Not on file   Tobacco Use    Smoking status: Never Smoker    Smokeless tobacco: Never Used   Substance and Sexual Activity    Alcohol use: No     Alcohol/week: 0.0 standard drinks    Drug use: No    Sexual activity: Not Currently   Other Topics Concern    Not on file   Social History Narrative    Not on file     Social Determinants of Health     Financial Resource Strain:     Difficulty of Paying Living Expenses: Not on file   Food Insecurity:     Worried About Running Out of Food in the Last Year: Not on file    Dave of Food in the Last Year: Not on file   Transportation Needs:     Lack of Transportation (Medical): Not on file    Lack of Transportation (Non-Medical):  Not on file   Physical Activity:     Days of Exercise per Week: Not on file    Minutes of Exercise per Session: Not on file   Stress:     Feeling of Stress : Not on file   Social Connections:     Frequency of Communication with Friends and Family: Not on file    Frequency of Social Gatherings with Friends and Family: Not on file    Attends Episcopal Services: Not on file  Active Member of Clubs or Organizations: Not on file    Attends Club or Organization Meetings: Not on file    Marital Status: Not on file   Intimate Partner Violence:     Fear of Current or Ex-Partner: Not on file    Emotionally Abused: Not on file    Physically Abused: Not on file    Sexually Abused: Not on file   Housing Stability:     Unable to Pay for Housing in the Last Year: Not on file    Number of [place] in the Last Year: Not on file    Unstable Housing in the Last Year: Not on file       OBJECTIVE:    Physical Exam:  Visit Vitals  BP (!) 150/73 (BP 1 Location: Left arm, BP Patient Position: Sitting)   Pulse 71   Ht 5' 6\" (1.676 m)   Wt 310 lb 12.8 oz (141 kg)   BMI 50.16 kg/m²      General: Alert and oriented. No acute distress. Well-nourished  HEENT: No thyroid enlargment. Neck supple without restrictions. Sclera normal. Normal occular motion. Moist mucous membranes. Lymphatics: No evidence of axillary, cervical, or subclavicular adenopathy. Respiratory: clear to auscultation and percussion to the bases. No CVAT. Cardiovascular: regular rate and rhythm. No murmurs, rubs, or gallops. Gastrointestinal: obese, soft, non-tender, non-distended, no masses or organomegaly. Well-healed incisions. Musculoskeletal: normal gait. No joint tenderness, deformity or swelling. No muscular tenderness. Extremities: extremities normal, atraumatic, no cyanosis or edema. Pelvic: exam chaperoned by nurse. Normal appearing external genitalia. On speculum exam, the vagina is atrophic. The uterus and cervix are surgically absent. No evidence of masses or nodularity on bimanual exam. Smooth walls of the vagina. Deferred rectovaginal exam.   Neuro: Grossly intact. Normal gait and movement. No acute deficit  Skin: No evidence of rashes or skin changes. IMPRESSION/PLAN:    Ms. Lexy Marroquin is a 68 y.o. female s/p TLH/BSO/staging on 10/19/2015 for Stage Ia, FIGO Grade 1 endometrial cancer. Patient Active Problem List    Diagnosis Date Noted    Endometrial cancer ([facility] 02/12/2019    Atrial fibrillation ([facility] 09/24/2015    Chronic anticoagulation 09/24/2015    Obesity, morbid, BMI 50 or higher ([facility] 09/24/2015     Reviewed patient's course to date. SHAKEEL on exam. Reassured patient. RTC in 1 year for continued surveillance. Reviewed precautionary symptoms to return sooner. All questions and concerns were addressed with the patient and she is comfortable with the plan. An electronic signature was used to authenticate this note.      Lynnette Pool MD        CC:   Apolinar Ganser, MD Apolinar Ganser, MD

## 2022-03-19 PROBLEM — C54.1 ENDOMETRIAL CANCER (HCC): Status: ACTIVE | Noted: 2019-02-12

## 2023-02-20 NOTE — PROGRESS NOTES
One year follow up for endometrial cancer, pt reports no abnormal spotting or bleeding, pt states she has no questions or concerns for today's visit, pt states she checks her blood pressure at home, every morning, advised if having high blood pressure readings to contact PCP    Vitals:    02/22/23 0916 02/22/23 0932   BP: (!) 144/101 (!) 116/94   BP 1 Location: Left upper arm Left upper arm   BP Patient Position: Sitting Sitting   Pulse: 88 (!) 110   Height: 5' 6\" (1.676 m)    Weight: 316 lb (143.3 kg)          1. Have you been to the ER, urgent care clinic since your last visit? Hospitalized since your last visit?  no    2. Have you seen or consulted any other health care providers outside of the [address] [place] [address] since your last visit? Include any pap smears or colon screening.    no

## 2023-02-22 ENCOUNTER — OFFICE VISIT (OUTPATIENT)
Dept: GYNECOLOGY | Age: 78
End: 2023-02-22
Payer: MEDICARE

## 2023-02-22 VITALS
WEIGHT: 293 LBS | DIASTOLIC BLOOD PRESSURE: 94 MMHG | HEIGHT: 66 IN | HEART RATE: 110 BPM | SYSTOLIC BLOOD PRESSURE: 116 MMHG | BODY MASS INDEX: 47.09 KG/M2

## 2023-02-22 DIAGNOSIS — C54.1 ENDOMETRIAL CANCER (HCC): Primary | ICD-10-CM

## 2023-02-22 PROCEDURE — 1123F ACP DISCUSS/DSCN MKR DOCD: CPT | Performed by: OBSTETRICS & GYNECOLOGY

## 2023-02-22 PROCEDURE — 1101F PT FALLS ASSESS-DOCD LE1/YR: CPT | Performed by: OBSTETRICS & GYNECOLOGY

## 2023-02-22 PROCEDURE — 99213 OFFICE O/P EST LOW 20 MIN: CPT | Performed by: OBSTETRICS & GYNECOLOGY

## 2023-02-22 PROCEDURE — G8400 PT W/DXA NO RESULTS DOC: HCPCS | Performed by: OBSTETRICS & GYNECOLOGY

## 2023-02-22 PROCEDURE — 1090F PRES/ABSN URINE INCON ASSESS: CPT | Performed by: OBSTETRICS & GYNECOLOGY

## 2023-02-22 PROCEDURE — G8427 DOCREV CUR MEDS BY ELIG CLIN: HCPCS | Performed by: OBSTETRICS & GYNECOLOGY

## 2023-02-22 PROCEDURE — G8417 CALC BMI ABV UP PARAM F/U: HCPCS | Performed by: OBSTETRICS & GYNECOLOGY

## 2023-02-22 PROCEDURE — G0463 HOSPITAL OUTPT CLINIC VISIT: HCPCS | Performed by: OBSTETRICS & GYNECOLOGY

## 2023-02-22 PROCEDURE — G8432 DEP SCR NOT DOC, RNG: HCPCS | Performed by: OBSTETRICS & GYNECOLOGY

## 2023-02-22 PROCEDURE — G8536 NO DOC ELDER MAL SCRN: HCPCS | Performed by: OBSTETRICS & GYNECOLOGY

## 2023-02-22 RX ORDER — HYDRALAZINE HYDROCHLORIDE 25 MG/1
25 TABLET, FILM COATED ORAL 3 TIMES DAILY
COMMUNITY
Start: 2022-07-01

## 2023-02-22 RX ORDER — PANTOPRAZOLE SODIUM 40 MG/1
TABLET, DELAYED RELEASE ORAL
COMMUNITY
Start: 2023-01-23

## 2023-02-22 NOTE — PROGRESS NOTES
[facility] GYNECOLOGIC ONCOLOGY  [address] [place] [address], [address], [address]  [phone]  PYS [phone]       Office Note  Patient ID:  Name: Mehul Cook  MRM: [id number]  : 77 y.o. Date: 2023      Problem List:   Patient Active Problem List   Diagnosis Code    Atrial fibrillation ([facility] [address] I48.91    Chronic anticoagulation Z79.01    Obesity, morbid, BMI 50 or higher (HCC) E66.01    Endometrial cancer ([facility] [address] C54.1       SUBJECTIVE:  Ms. Mehul Cook is a 68 y.o. female, an established patient, s/p TLH/BSO/staging on 10/19/2015 for Stage Ia, FIGO Grade 1 endometrial cancer. Presents today for continued surveillance. Doing well without complaints. Denies vaginal bleeding/discharge, abdominal/pelvic pain, nausea, vomiting, constipation, diarrhea, CP, SOB, hematuria, hematochezia, change in appetite or bowel movements, bloating, fevers, chills, or urinary symptoms. Remains on aspirin and Plavix for her cardiac history. Reports maybe a small vaginal spotting rarely. Noticed while wiping after urinating. Pathology Review 10/19/2015:  FINAL PATHOLOGIC DIAGNOSIS  1.  Uterus, bilateral fallopian tubes and ovaries, hysterectomy bilateral salpingo-oophorectomy:  Adenocarcinoma, endometrioid type of the uterine corpus, FIGO grade 1 of 3, invading 0.7cm of 1.5cm  multifocal (three foci)  Benign cervix, no tumor seen  Leiomyoma uteri  Endometriosis  Benign parametrial remnants, no tumor seen  Bilateral fallopian tubes, no tumor seen  Bilateral ovaries, no tumor seen  Synoptic Report:  Specimen: Uterus, bilateral fallopian tubes and ovaries  Procedure: Hysterectomy, bilateral salpingo-oophorectomy  Lymph node sampling: None  Specimen integrity: Intact hysterectomy specimen  Tumor size: 7.0 x 6.0 cm (left lateral); 2.0 (anterior fundus); 1.3cm (posterior)  Histologic type: Endometrioid adenocarcinoma  Histologic ndgndrndanddndend:nd nd2nd Myometrial invasion:  Depth of invasion: 0.7cm at the deepest location  Myometrial thickness: 1.5cm  Involvement of cervix: Not involved  Extent of involvement of other organs:  Right ovary: Not involved  Left ovary: Not involved  Right fallopian tube: Not involved  Left fallopian tube: Not involved  Lymphovascular invasion: Present  Additional pathologic findings: Leiomyoma, endometriosis  Pathologic staging (pTNM):  Primary tumor (pT): pT1a  Regional lymph nodes (pN): pNx  Pelvic lymph nodes:  Number examined: 0  Number involved: N/A  Para-aortic lymph nodes:  Number examined: 0  Number involved: N/A  Distant metastasis (M): Not applicable  2. Umbilicus, excision:  Benign skin and subcutaneous fibroadipose tissue, no histopathologic changes  3. Hernia, herniorrhaphy:  Benign mesothelial lined fibroadipose tissue with mild chronic inflammation consistent with hernia sac    Cytology Review:  Negative: 2/2018, 8/2017, 2/27/2017, 8/25/2016, 2/2016      Medications:     Current Outpatient Medications on File Prior to Visit   Medication Sig Dispense Refill    hydrALAZINE (APRESOLINE) 25 mg tablet Take 25 mg by mouth three (3) times daily. pantoprazole (PROTONIX) 40 mg tablet       aspirin delayed-release 81 mg tablet Take  by mouth daily. cholecalciferol, vitamin D3, (VITAMIN D3 PO) Take  by mouth. allopurinol (ZYLOPRIM) 100 mg tablet       ferrous sulfate 325 mg (65 mg iron) tablet Take 65 mg by mouth three (3) times daily (with meals). MULTIVITAMIN (MULTIPLE VITAMINS PO) Take 1 Tab by mouth daily. ELIQUIS 5 mg tablet       atorvastatin (LIPITOR) 20 mg tablet 20 mg nightly. diltiazem CD (CARDIZEM CD) 240 mg ER capsule 240 mg daily. levothyroxine (SYNTHROID) 75 mcg tablet       torsemide (DEMADEX) 20 mg tablet daily. OMEPRAZOLE PO Take  by mouth. (Patient not taking: No sig reported)      clopidogreL (PLAVIX) 75 mg tab Take  by mouth.  (Patient not taking: No sig reported)      oxyCODONE-acetaminophen (PERCOCET) 5-325 mg per tablet Take 1 Tab by mouth every four (4) hours as needed for Pain. Max Daily Amount: 6 Tabs. (Patient not taking: No sig reported) 25 Tab 0    lisinopril (PRINIVIL, ZESTRIL) 20 mg tablet 20 mg daily. (Patient not taking: No sig reported)       No current facility-administered medications on file prior to visit. Allergies:   No Known Allergies  Past Medical History:   Diagnosis Date    Arrhythmia     a fib    Arthritis     Gout 06/2017    High cholesterol     History of GI diverticular bleed     Hypertension     Iron deficiency anemia     Morbid obesity ([facility] [address]     Thyroid disease      Past Surgical History:   Procedure Laterality Date    HX COLONOSCOPY      HX ENDOSCOPY      HX GYN      2 vaginal births     Social History     Socioeconomic History    Marital status:      Spouse name: Not on file    Number of children: Not on file    Years of education: Not on file    Highest education level: Not on file   Occupational History    Not on file   Tobacco Use    Smoking status: Never    Smokeless tobacco: Never   Substance and Sexual Activity    Alcohol use: No     Alcohol/week: 0.0 standard drinks    Drug use: No    Sexual activity: Not Currently   Other Topics Concern    Not on file   Social History Narrative    Not on file     Social Determinants of Health     Financial Resource Strain: Not on file   Food Insecurity: Not on file   Transportation Needs: Not on file   Physical Activity: Not on file   Stress: Not on file   Social Connections: Not on file   Intimate Partner Violence: Not on file   Housing Stability: Not on file       OBJECTIVE:    Physical Exam:  Visit Vitals  BP (!) 144/101 (BP 1 Location: Left upper arm, BP Patient Position: Sitting)   Pulse 88   Ht 5' 6\" (1.676 m)   Wt 316 lb (143.3 kg)   BMI 51.00 kg/m²        General: Alert and oriented. No acute distress. Well-nourished  HEENT: No thyroid enlargment. Neck supple without restrictions. Sclera normal. Normal occular motion.  Moist mucous membranes. Lymphatics: No evidence of axillary, cervical, or subclavicular adenopathy. Respiratory: clear to auscultation and percussion to the bases. No CVAT. Cardiovascular: regular rate and rhythm. No murmurs, rubs, or gallops. Gastrointestinal: obese, soft, non-tender, non-distended, no masses or organomegaly. Well-healed incisions. Musculoskeletal: normal gait. No joint tenderness, deformity or swelling. No muscular tenderness. Extremities: extremities normal, atraumatic, no cyanosis or edema. Pelvic: exam chaperoned by nurse. Normal appearing external genitalia. On speculum exam, the vagina is atrophic. The uterus and cervix are surgically absent. No evidence of masses or nodularity on bimanual exam. Smooth walls of the vagina. Deferred rectovaginal exam.   Neuro: Grossly intact. Normal gait and movement. No acute deficit  Skin: No evidence of rashes or skin changes. IMPRESSION/PLAN:    Ms. Jonathan Gordon is a 68 y.o. female s/p TLH/BSO/staging on 10/19/2015 for Stage Ia, FIGO Grade 1 endometrial cancer. Patient Active Problem List    Diagnosis Date Noted    Endometrial cancer ([facility] [address] 02/12/2019    Atrial fibrillation ([facility] [address] 09/24/2015    Chronic anticoagulation 09/24/2015    Obesity, morbid, BMI 50 or higher ([facility] [address] 09/24/2015     Reviewed patient's course to date. SHAKEEL on exam. Reassured patient. RTC in 1 year for continued surveillance. Reviewed precautionary symptoms to return sooner. All questions and concerns were addressed with the patient and she is comfortable with the plan. An electronic signature was used to authenticate this note.      Jose Luis Burton MD        CC:   MD German Velazquez MD

## 2023-05-20 RX ORDER — ALLOPURINOL 100 MG/1
TABLET ORAL
COMMUNITY
Start: 2017-12-18

## 2023-05-20 RX ORDER — DILTIAZEM HYDROCHLORIDE 240 MG/1
240 CAPSULE, EXTENDED RELEASE ORAL DAILY
COMMUNITY
Start: 2015-07-15

## 2023-05-20 RX ORDER — ASPIRIN 81 MG/1
TABLET ORAL DAILY
COMMUNITY

## 2023-05-20 RX ORDER — FERROUS SULFATE 325(65) MG
65 TABLET ORAL
COMMUNITY

## 2023-05-20 RX ORDER — CLOPIDOGREL BISULFATE 75 MG/1
TABLET ORAL
COMMUNITY

## 2023-05-20 RX ORDER — LISINOPRIL 20 MG/1
20 TABLET ORAL DAILY
COMMUNITY
Start: 2015-06-21

## 2023-05-20 RX ORDER — PANTOPRAZOLE SODIUM 40 MG/1
TABLET, DELAYED RELEASE ORAL
COMMUNITY
Start: 2023-01-23

## 2023-05-20 RX ORDER — ATORVASTATIN CALCIUM 20 MG/1
20 TABLET, FILM COATED ORAL
COMMUNITY
Start: 2015-09-04

## 2023-05-20 RX ORDER — OXYCODONE HYDROCHLORIDE AND ACETAMINOPHEN 5; 325 MG/1; MG/1
1 TABLET ORAL EVERY 4 HOURS PRN
COMMUNITY
Start: 2015-10-20

## 2023-05-20 RX ORDER — LEVOTHYROXINE SODIUM 0.07 MG/1
TABLET ORAL
COMMUNITY
Start: 2015-09-04

## 2023-05-20 RX ORDER — TORSEMIDE 20 MG/1
TABLET ORAL DAILY
COMMUNITY
Start: 2015-09-23

## 2023-05-20 RX ORDER — HYDRALAZINE HYDROCHLORIDE 25 MG/1
25 TABLET, FILM COATED ORAL 3 TIMES DAILY
COMMUNITY
Start: 2022-07-01

## 2024-02-28 ENCOUNTER — OFFICE VISIT (OUTPATIENT)
Age: 79
End: 2024-02-28
Payer: MEDICARE

## 2024-02-28 VITALS
HEART RATE: 99 BPM | DIASTOLIC BLOOD PRESSURE: 98 MMHG | HEIGHT: 65 IN | BODY MASS INDEX: 48.82 KG/M2 | WEIGHT: 293 LBS | SYSTOLIC BLOOD PRESSURE: 148 MMHG

## 2024-02-28 DIAGNOSIS — C54.1 ENDOMETRIAL CANCER (HCC): Primary | ICD-10-CM

## 2024-02-28 PROCEDURE — G8417 CALC BMI ABV UP PARAM F/U: HCPCS | Performed by: OBSTETRICS & GYNECOLOGY

## 2024-02-28 PROCEDURE — 99212 OFFICE O/P EST SF 10 MIN: CPT | Performed by: OBSTETRICS & GYNECOLOGY

## 2024-02-28 PROCEDURE — 1036F TOBACCO NON-USER: CPT | Performed by: OBSTETRICS & GYNECOLOGY

## 2024-02-28 PROCEDURE — G8484 FLU IMMUNIZE NO ADMIN: HCPCS | Performed by: OBSTETRICS & GYNECOLOGY

## 2024-02-28 PROCEDURE — 1090F PRES/ABSN URINE INCON ASSESS: CPT | Performed by: OBSTETRICS & GYNECOLOGY

## 2024-02-28 PROCEDURE — G8399 PT W/DXA RESULTS DOCUMENT: HCPCS | Performed by: OBSTETRICS & GYNECOLOGY

## 2024-02-28 PROCEDURE — 1123F ACP DISCUSS/DSCN MKR DOCD: CPT | Performed by: OBSTETRICS & GYNECOLOGY

## 2024-02-28 PROCEDURE — G8427 DOCREV CUR MEDS BY ELIG CLIN: HCPCS | Performed by: OBSTETRICS & GYNECOLOGY

## 2024-02-28 NOTE — PROGRESS NOTES
One year follow up for endometrial cancer, pt reports no abnormal spotting or bleeding, pt states she has no questions or concerns for today's visit    1. Have you been to the ER, urgent care clinic since your last visit?  Hospitalized since your last visit?  no    2. Have you seen or consulted any other health care providers [facility] since your last visit?  Include any pap smears or colon screening.   no            
tablet      apixaban (ELIQUIS) 5 MG TABS tablet ceived the following from [facility]: Outside name: ELIQUIS 5 mg tablet      aspirin 81 MG EC tablet Take by mouth daily      atorvastatin (LIPITOR) 20 MG tablet 1 tablet      clopidogrel (PLAVIX) 75 MG tablet Take by mouth      dilTIAZem (TIAZAC) 240 MG extended release capsule 1 capsule daily      ferrous sulfate (IRON 325) 325 (65 Fe) MG tablet Take 65 mg by mouth 3 times daily (with meals)      hydrALAZINE (APRESOLINE) 25 MG tablet Take 1 tablet by mouth 3 times daily      levothyroxine (SYNTHROID) 75 MCG tablet ceived the following from [facility]: Outside name: levothyroxine (SYNTHROID) 75 mcg tablet      lisinopril (PRINIVIL;ZESTRIL) 20 MG tablet 1 tablet daily      oxyCODONE-acetaminophen (PERCOCET) 5-325 MG per tablet Take 1 tablet by mouth every 4 hours as needed. Max Daily Amount: 6 tablets      pantoprazole (PROTONIX) 40 MG tablet ceived the following from [facility]: Outside name: pantoprazole (PROTONIX) 40 mg tablet      torsemide (DEMADEX) 20 MG tablet daily       No current facility-administered medications on file prior to visit.       Not on File     OBJECTIVE:    Physical Exam:  There were no vitals filed for this visit.    General: Alert and oriented. No acute distress. Well-nourished  HEENT: No thyroid enlargment. Neck supple without restrictions. Sclera normal. Normal occular motion. Moist mucous membranes.  Lymphatics: No evidence of axillary, cervical, or subclavicular adenopathy.   Respiratory: clear to auscultation and percussion to the bases. No CVAT.  Cardiovascular: regular rate and rhythm. No murmurs, rubs, or gallops.  Gastrointestinal: obese, soft, non-tender, non-distended, no masses or organomegaly. Well-healed incisions.   Musculoskeletal: normal gait. No joint tenderness, deformity or swelling. No muscular tenderness.   Extremities: extremities normal, atraumatic, no cyanosis or

## 2025-02-04 ENCOUNTER — CLINICAL DOCUMENTATION (OUTPATIENT)
Age: 80
End: 2025-02-04

## 2025-02-04 ENCOUNTER — OFFICE VISIT (OUTPATIENT)
Age: 80
End: 2025-02-04
Payer: MEDICARE

## 2025-02-04 VITALS
HEART RATE: 134 BPM | SYSTOLIC BLOOD PRESSURE: 164 MMHG | DIASTOLIC BLOOD PRESSURE: 77 MMHG | BODY MASS INDEX: 48.82 KG/M2 | WEIGHT: 293 LBS | HEIGHT: 65 IN

## 2025-02-04 DIAGNOSIS — C54.1 ENDOMETRIAL CANCER (HCC): Primary | ICD-10-CM

## 2025-02-04 PROCEDURE — G8399 PT W/DXA RESULTS DOCUMENT: HCPCS | Performed by: OBSTETRICS & GYNECOLOGY

## 2025-02-04 PROCEDURE — 1036F TOBACCO NON-USER: CPT | Performed by: OBSTETRICS & GYNECOLOGY

## 2025-02-04 PROCEDURE — G8427 DOCREV CUR MEDS BY ELIG CLIN: HCPCS | Performed by: OBSTETRICS & GYNECOLOGY

## 2025-02-04 PROCEDURE — 1159F MED LIST DOCD IN RCRD: CPT | Performed by: OBSTETRICS & GYNECOLOGY

## 2025-02-04 PROCEDURE — 99212 OFFICE O/P EST SF 10 MIN: CPT | Performed by: OBSTETRICS & GYNECOLOGY

## 2025-02-04 PROCEDURE — 1160F RVW MEDS BY RX/DR IN RCRD: CPT | Performed by: OBSTETRICS & GYNECOLOGY

## 2025-02-04 PROCEDURE — 1123F ACP DISCUSS/DSCN MKR DOCD: CPT | Performed by: OBSTETRICS & GYNECOLOGY

## 2025-02-04 PROCEDURE — 1126F AMNT PAIN NOTED NONE PRSNT: CPT | Performed by: OBSTETRICS & GYNECOLOGY

## 2025-02-04 PROCEDURE — G8417 CALC BMI ABV UP PARAM F/U: HCPCS | Performed by: OBSTETRICS & GYNECOLOGY

## 2025-02-04 PROCEDURE — 1090F PRES/ABSN URINE INCON ASSESS: CPT | Performed by: OBSTETRICS & GYNECOLOGY

## 2025-02-04 ASSESSMENT — PATIENT HEALTH QUESTIONNAIRE - PHQ9
SUM OF ALL RESPONSES TO PHQ9 QUESTIONS 1 & 2: 0
SUM OF ALL RESPONSES TO PHQ QUESTIONS 1-9: 0
SUM OF ALL RESPONSES TO PHQ QUESTIONS 1-9: 0
1. LITTLE INTEREST OR PLEASURE IN DOING THINGS: NOT AT ALL
SUM OF ALL RESPONSES TO PHQ QUESTIONS 1-9: 0
2. FEELING DOWN, DEPRESSED OR HOPELESS: NOT AT ALL
SUM OF ALL RESPONSES TO PHQ QUESTIONS 1-9: 0

## 2025-02-04 NOTE — PROGRESS NOTES
NCCN Distress Thermometer    [facility] Gynecologic Oncology    Date Screening Completed: 2/4/25    Screening Declined:  [] Yes    Number that best describes how much distress you've experienced in the past week, including today?  0 [x] - No distress 1 []      2 []      3 []      4 []       5 []       6 []      7 []      8 []      9 []       10 [] - Extreme distress    PROBLEM LIST  Have you had concerns about any of the items below in the past week, including today?      Physical Concerns Practical Concerns   [] Pain [] Taking care of myself    [] Sleep [] Taking care of others    [x] Fatigue [] Safety   [] Tobacco use  [] Work   [] Substance use  [] School   [] Memory or concentration [] Housing/Utilities   [] Sexual health [] Finances   [] Changes in eating  [] Insurance   [] Loss or change of physical abilities  [] Transportation    []    Emotional Concerns [] Having enough food   [] Worry or anxiety [] Access to medicine   [] Sadness or depression [] Treatment decisions   [] Loss of interest or enjoyment     [x] Grief or loss  Spiritual or Congregational Concerns   [] Fear [] Sense of meaning or purpose   [] Loneliness  [] Changes in irina or beliefs   [] Anger [] Death, dying, or afterlife   [] Changes in appearance [] Conflict between beliefs and cancer treatments    [] Feelings of worthlessness or being a burden [] Relationship with the sacred    [] Ritual or dietary needs    Social Concerns     [] Relationship with spouse or partner     [] Relationship with children    [] Relationship with family members     [] Relationship with friends or coworkers     [] Communication with health care team     [] Ability to have children     [] Prejudice or discrimination        Other Concerns: n/a

## 2025-02-04 NOTE — PROGRESS NOTES
[facility] GYNECOLOGIC ONCOLOGY  [address] [place], [place] [address]  [place] [address] [phone]  FAX [phone]       Office Note  Patient ID:  Name: Rosalind Santana  MRM: [id number]  :  y.o.    SUBJECTIVE:  Ms. Rosalind Santana is a 79 y.o.   female, an established patient, s/p TLH/BSO/staging on 10/19/2015 for Stage Ia, FIGO Grade 1 endometrial cancer.     Presents today for continued surveillance. Doing well without complaints. Denies vaginal bleeding/discharge, abdominal/pelvic pain, nausea, vomiting, constipation, diarrhea, CP, SOB, hematuria, hematochezia, change in appetite or bowel movements, bloating, fevers, chills, or urinary symptoms.  Remains on aspirin and Plavix for her cardiac history. Reports maybe a small vaginal spotting rarely. Noticed while wiping after urinating.       Pathology Review 10/19/2015:  FINAL PATHOLOGIC DIAGNOSIS  1. Uterus, bilateral fallopian tubes and ovaries, hysterectomy bilateral salpingo-oophorectomy:  Adenocarcinoma, endometrioid type of the uterine corpus, FIGO grade 1 of 3, invading 0.7cm of 1.5cm  multifocal (three foci)  Benign cervix, no tumor seen  Leiomyoma uteri  Endometriosis  Benign parametrial remnants, no tumor seen  Bilateral fallopian tubes, no tumor seen  Bilateral ovaries, no tumor seen  Synoptic Report:  Specimen: Uterus, bilateral fallopian tubes and ovaries  Procedure: Hysterectomy, bilateral salpingo-oophorectomy  Lymph node sampling: None  Specimen integrity: Intact hysterectomy specimen  Tumor size: 7.0 x 6.0 cm (left lateral); 2.0 (anterior fundus); 1.3cm (posterior)  Histologic type: Endometrioid adenocarcinoma  Histologic ndgndrndanddndend:nd nd2nd Myometrial invasion:  Depth of invasion: 0.7cm at the deepest location  Myometrial thickness: 1.5cm  Involvement of cervix: Not involved  Extent of involvement of other organs:  Right ovary: Not involved  Left ovary: Not involved  Right fallopian tube: Not involved  Left fallopian tube: Not

## 2025-02-04 NOTE — PROGRESS NOTES
One year follow up for endometrial cancer, pt reports no abnormal spotting or bleeding, pt states she has no questions or concerns for today's visit    1. Have you been to the ER, urgent care clinic since your last visit?  Hospitalized since your last visit?  no    2. Have you seen or consulted any other health care providers outside of the [facility] since your last visit?  Include any pap smears or colon screening.   no